# Patient Record
Sex: FEMALE | Race: WHITE | NOT HISPANIC OR LATINO | Employment: OTHER
[De-identification: names, ages, dates, MRNs, and addresses within clinical notes are randomized per-mention and may not be internally consistent; named-entity substitution may affect disease eponyms.]

---

## 2021-08-29 ENCOUNTER — HEALTH MAINTENANCE LETTER (OUTPATIENT)
Age: 71
End: 2021-08-29

## 2021-09-01 ENCOUNTER — TELEPHONE (OUTPATIENT)
Dept: ORTHOPEDICS | Facility: CLINIC | Age: 71
End: 2021-09-01

## 2021-10-11 DIAGNOSIS — M25.569 KNEE PAIN: Primary | ICD-10-CM

## 2021-10-11 PROBLEM — E11.9 DIABETES MELLITUS, TYPE II (H): Status: ACTIVE | Noted: 2018-07-26

## 2021-10-11 PROBLEM — N18.30 STAGE 3 CHRONIC KIDNEY DISEASE (H): Status: ACTIVE | Noted: 2020-04-30

## 2021-10-11 PROBLEM — I10 ESSENTIAL HYPERTENSION: Status: ACTIVE | Noted: 2020-01-07

## 2021-10-11 NOTE — PROGRESS NOTES
[facility]  Orthopaedic Surgery Consultation by Ernesto Avelar M.D.    Shannan Riojas MRN# [id number]   Age: 71 year old YOB: 1950     Requesting physician: No ref. provider found  No primary care provider on file.     Background history:  DX:  1. Stage 3 chronic kidney disease (H)  2. Essential hypertension  3. Diabetes mellitus, type II (H)  4. Hypercholesterolemia  5. Obesity    TREATMENTS:  1. None           History of Present Illness:   71 year old female presented to our clinic because of chronic right knee pain.  This pain has been present for multiple years.  It and started approximately 3 years after she fell twisted her knee.  She has attempted icing, heat, over-the-counter analgesics to mitigate the pain.  She cannot take anti-inflammatories because of her chronic kidney disease stage III.  Pain is present most notably on the medial side and does not radiate.  Patient is endorsing significant pain and limitations during ambulation, squatting and walking stairs.  She reports occasional night pain.  No significant initiation stiffness or soreness.  No lower back or hip pain.  No motor or sensory deficits.  The knee does not seem to swell.  No clear mechanical symptoms or instability.  Patient tries to remain active and does home exercise regimen.  She has not had intra-articular injections yet.    Her latest A1c was 6.9.  Patient does not smoke.    Social:   Occupation: Retired  Living situation: Lives with 2 dogs, has daughter close by  Hobbies / Sports: Sewing, reading, beading    Smoking: No  Alcohol: No  Illicit drug use: No         Physical Exam:     EXAMINATION pertinent findings:   PSYCH: Pleasant, healthy-appearing, alert, oriented x3, cooperative. Normal mood and affect.  VITAL SIGNS: There were no vitals taken for this visit.  Reviewed nursing intake notes.   There is no height or weight on file to calculate BMI.  RESP: non labored breathing   ABD: benign, soft,  non-tender, no acute peritoneal findings  SKIN: grossly normal   LYMPHATIC: grossly normal, no adenopathy, no extremity edema  NEURO: grossly normal , no motor deficits  VASCULAR: satisfactory perfusion of all extremities   MUSCULOSKELETAL:   Alignment: Slight varus alignment of right lower extremity.  Gait: Normal    Right hip exhibits a full range of motion.  No pain upon rotations.  Lasegue's test is negative.  No tenderness to palpation of the greater trochanter region.    R knee: -0-0  .  Full extension and deep flexion are recognizably painful in the medial side of the knee.  Straight leg raise +. No redness, warmth or skin changes present. Effusion Minimal. Ligamentously stable in both ML and AP direction.  Varus deformity is difficult to correct.  Normal PF tracking without crepitus.  Meniscal provocation tests are sensitive.  There is recognizable tenderness to palpation over the tibial joint line.     Right LE:   Thigh and leg compartments soft and compressible   +Quad/TA/GSC/FHL/EHL   SILT DP/SP/Deepa/Saph/Tib nerve distributions   Palpable dorsalis pedis pulse              Data:   All laboratory data reviewed  All imaging studies reviewed by me personally.    XR alignment films 10/27/2021:  My interpretation: Varus alignment of right lower extremity.    XR knee right 8/24/2021:  My interpretation: End-stage osteoarthritic changes of the right knee medial compartment with complete obliteration of joint space.  Presence of marginal osteophytes, sclerosis and subchondral cyst.  Mild to moderate osteoarthritic change of lateral and patellofemoral compartments.         Assessment and Plan:   Assessment:  71-year-old female presenting with chronic right knee pain due to underlying end-stage osteoarthritic changes most notably in the medial compartment.  Significantly reducing her quality of life and insufficiently responding to nonoperative treatment measures initiated.    Plan:  I had a long discussion  with the patient regarding ongoing management options.  Reviewed surgical and nonsurgical treatments.  The non-surgical options include activity modification, pain medication, PT, bracing and injection therapy.  We discussed that optimization of nonoperative treatment strategy is possible by means of injection therapy.  I do not see any benefit for  bracing as the varus deformity is not correctable.      As for surgery we discussed the option of total knee replacement surgery. We reviewed total knee replacement in detail including the procedure, the implants, the recovery process, and long-term outcomes.  We reviewed that the risks of the surgery include but are not limited to infection, wound problems, stiffness, persistent pain, swelling, clicking, loosening, revision surgery.  We also reviewed less common risks such as neurovascular injury fracture, and other implant-related issues.  We reviewed other medical complications such as a blood clot.  We discussed that the vast majority of cases have a highly successful outcome.  However there is a small subset of patients that do experience complications or problems following the knee replacement and these problems can be very debilitating and painful and sometimes do not improve. Based on a discussion of the risks and benefits, patient  would like to proceed with surgery as she is looking for long-term and sustained relief.  We will work on scheduling surgery at a time that works well for patient in the next few months.  Patient will contact us if there are any questions or concerns leading up to surgery. Before surgery the patient will attend a joint replacement class and will be seen by the PCP and dentist.     More information on joint replacements can be found on : [email or web address]    Thank you for your referral.    Ernesto Avelar MD, PhD     Adult Reconstruction  [facility]  [place] Department of Orthopaedic Surgery  Pager [phone]      DATA for DOCUMENTATION:         Past Medical History:     Patient Active Problem List   Diagnosis     Stage 3 chronic kidney disease (H)     Diabetes mellitus, type II (H)     Essential hypertension     Hypercholesterolemia     Obesity, unspecified     No past medical history on file.    Also see scanned health assessment forms.       Past Surgical History:   No past surgical history on file.         Social History:     Social History     Socioeconomic History     Marital status:      Spouse name: Not on file     Number of children: Not on file     Years of education: Not on file     Highest education level: Not on file   Occupational History     Not on file   Tobacco Use     Smoking status: Not on file   Substance and Sexual Activity     Alcohol use: Not on file     Drug use: Not on file     Sexual activity: Not on file   Other Topics Concern     Not on file   Social History Narrative     Not on file     Social Determinants of Health     Financial Resource Strain:      Difficulty of Paying Living Expenses:    Food Insecurity:      Worried About Running Out of Food in the Last Year:      Ran Out of Food in the Last Year:    Transportation Needs:      Lack of Transportation (Medical):      Lack of Transportation (Non-Medical):    Physical Activity:      Days of Exercise per Week:      Minutes of Exercise per Session:    Stress:      Feeling of Stress :    Social Connections:      Frequency of Communication with Friends and Family:      Frequency of Social Gatherings with Friends and Family:      Attends Voodoo Services:      Active Member of Clubs or Organizations:      Attends Club or Organization Meetings:      Marital Status:    Intimate Partner Violence:      Fear of Current or Ex-Partner:      Emotionally Abused:      Physically Abused:      Sexually Abused:             Family History:     No family history on file.          Medications:     No current outpatient medications on file.     No current facility-administered medications for this visit.              Review of Systems:   A comprehensive 10 point review of systems (constitutional, ENT, cardiac, peripheral vascular, lymphatic, respiratory, GI, , Musculoskeletal, skin, Neurological) was performed and found to be negative except as described in this note.     See intake form completed by patient

## 2021-10-24 ENCOUNTER — HEALTH MAINTENANCE LETTER (OUTPATIENT)
Age: 71
End: 2021-10-24

## 2021-10-27 ENCOUNTER — ANCILLARY PROCEDURE (OUTPATIENT)
Dept: GENERAL RADIOLOGY | Facility: CLINIC | Age: 71
End: 2021-10-27
Attending: STUDENT IN AN ORGANIZED HEALTH CARE EDUCATION/TRAINING PROGRAM
Payer: COMMERCIAL

## 2021-10-27 ENCOUNTER — OFFICE VISIT (OUTPATIENT)
Dept: ORTHOPEDICS | Facility: CLINIC | Age: 71
End: 2021-10-27
Payer: COMMERCIAL

## 2021-10-27 DIAGNOSIS — M17.11 PRIMARY OSTEOARTHRITIS OF RIGHT KNEE: Primary | ICD-10-CM

## 2021-10-27 DIAGNOSIS — M25.569 KNEE PAIN: ICD-10-CM

## 2021-10-27 PROCEDURE — 99204 OFFICE O/P NEW MOD 45 MIN: CPT | Performed by: STUDENT IN AN ORGANIZED HEALTH CARE EDUCATION/TRAINING PROGRAM

## 2021-10-27 PROCEDURE — 77073 BONE LENGTH STUDIES: CPT | Performed by: STUDENT IN AN ORGANIZED HEALTH CARE EDUCATION/TRAINING PROGRAM

## 2021-10-27 RX ORDER — CEFAZOLIN SODIUM 2 G/50ML
2 SOLUTION INTRAVENOUS SEE ADMIN INSTRUCTIONS
Status: CANCELLED | OUTPATIENT
Start: 2021-10-27

## 2021-10-27 RX ORDER — LEVOTHYROXINE SODIUM 125 UG/1
TABLET ORAL
COMMUNITY
Start: 2021-05-28

## 2021-10-27 RX ORDER — METOPROLOL SUCCINATE 100 MG/1
100 TABLET, EXTENDED RELEASE ORAL DAILY
COMMUNITY
Start: 2020-12-30

## 2021-10-27 RX ORDER — TRANEXAMIC ACID 650 MG/1
1950 TABLET ORAL ONCE
Status: CANCELLED | OUTPATIENT
Start: 2021-10-27 | End: 2021-10-27

## 2021-10-27 RX ORDER — TRAZODONE HYDROCHLORIDE 50 MG/1
TABLET, FILM COATED ORAL
COMMUNITY
Start: 2021-06-28

## 2021-10-27 RX ORDER — LISINOPRIL 10 MG/1
TABLET ORAL
COMMUNITY
Start: 2021-03-28 | End: 2022-02-02

## 2021-10-27 RX ORDER — AMLODIPINE BESYLATE 10 MG/1
10 TABLET ORAL DAILY
COMMUNITY
Start: 2021-01-26

## 2021-10-27 RX ORDER — CEFAZOLIN SODIUM 2 G/50ML
2 SOLUTION INTRAVENOUS
Status: CANCELLED | OUTPATIENT
Start: 2021-10-27

## 2021-10-27 RX ORDER — SERTRALINE HYDROCHLORIDE 100 MG/1
200 TABLET, FILM COATED ORAL
COMMUNITY
Start: 2020-12-23

## 2021-10-27 RX ORDER — ATORVASTATIN CALCIUM 40 MG/1
40 TABLET, FILM COATED ORAL DAILY
COMMUNITY
Start: 2019-11-08

## 2021-10-27 NOTE — LETTER
10/27/2021         RE: Shannan Riojas  [address]  [place] [address]        Dear Colleague,    Thank you for referring your patient, Shannan Riojas, to the [facility] [place]. Please see a copy of my visit note below.        [facility]  Orthopaedic Surgery Consultation by Ernesto Avelar M.D.    Shannan Riojas MRN# [id number]   Age: 71 year old YOB: 1950     Requesting physician: No ref. provider found  No primary care provider on file.     Background history:  DX:  1. Stage 3 chronic kidney disease (H)  2. Essential hypertension  3. Diabetes mellitus, type II (H)  4. Hypercholesterolemia  5. Obesity    TREATMENTS:  1. None           History of Present Illness:   71 year old female presented to our clinic because of chronic right knee pain.  This pain has been present for multiple years.  It and started approximately 3 years after she fell twisted her knee.  She has attempted icing, heat, over-the-counter analgesics to mitigate the pain.  She cannot take anti-inflammatories because of her chronic kidney disease stage III.  Pain is present most notably on the medial side and does not radiate.  Patient is endorsing significant pain and limitations during ambulation, squatting and walking stairs.  She reports occasional night pain.  No significant initiation stiffness or soreness.  No lower back or hip pain.  No motor or sensory deficits.  The knee does not seem to swell.  No clear mechanical symptoms or instability.  Patient tries to remain active and does home exercise regimen.  She has not had intra-articular injections yet.    Her latest A1c was 6.9.  Patient does not smoke.    Social:   Occupation: Retired  Living situation: Lives with 2 dogs, has daughter close by  Hobbies / Sports: Sewing, reading, beading    Smoking: No  Alcohol: No  Illicit drug use: No         Physical Exam:     EXAMINATION pertinent findings:   PSYCH: Pleasant,  healthy-appearing, alert, oriented x3, cooperative. Normal mood and affect.  VITAL SIGNS: There were no vitals taken for this visit.  Reviewed nursing intake notes.   There is no height or weight on file to calculate BMI.  RESP: non labored breathing   ABD: benign, soft, non-tender, no acute peritoneal findings  SKIN: grossly normal   LYMPHATIC: grossly normal, no adenopathy, no extremity edema  NEURO: grossly normal , no motor deficits  VASCULAR: satisfactory perfusion of all extremities   MUSCULOSKELETAL:   Alignment: Slight varus alignment of right lower extremity.  Gait: Normal    Right hip exhibits a full range of motion.  No pain upon rotations.  Lasegue's test is negative.  No tenderness to palpation of the greater trochanter region.    R knee: -0-0  .  Full extension and deep flexion are recognizably painful in the medial side of the knee.  Straight leg raise +. No redness, warmth or skin changes present. Effusion Minimal. Ligamentously stable in both ML and AP direction.  Varus deformity is difficult to correct.  Normal PF tracking without crepitus.  Meniscal provocation tests are sensitive.  There is recognizable tenderness to palpation over the tibial joint line.     Right LE:   Thigh and leg compartments soft and compressible   +Quad/TA/GSC/FHL/EHL   SILT DP/SP/Deepa/Saph/Tib nerve distributions   Palpable dorsalis pedis pulse              Data:   All laboratory data reviewed  All imaging studies reviewed by me personally.    XR alignment films 10/27/2021:  My interpretation: Varus alignment of right lower extremity.    XR knee right 8/24/2021:  My interpretation: End-stage osteoarthritic changes of the right knee medial compartment with complete obliteration of joint space.  Presence of marginal osteophytes, sclerosis and subchondral cyst.  Mild to moderate osteoarthritic change of lateral and patellofemoral compartments.         Assessment and Plan:   Assessment:  71-year-old female presenting  with chronic right knee pain due to underlying end-stage osteoarthritic changes most notably in the medial compartment.  Significantly reducing her quality of life and insufficiently responding to nonoperative treatment measures initiated.    Plan:  I had a long discussion with the patient regarding ongoing management options.  Reviewed surgical and nonsurgical treatments.  The non-surgical options include activity modification, pain medication, PT, bracing and injection therapy.  We discussed that optimization of nonoperative treatment strategy is possible by means of injection therapy.  I do not see any benefit for  bracing as the varus deformity is not correctable.      As for surgery we discussed the option of total knee replacement surgery. We reviewed total knee replacement in detail including the procedure, the implants, the recovery process, and long-term outcomes.  We reviewed that the risks of the surgery include but are not limited to infection, wound problems, stiffness, persistent pain, swelling, clicking, loosening, revision surgery.  We also reviewed less common risks such as neurovascular injury fracture, and other implant-related issues.  We reviewed other medical complications such as a blood clot.  We discussed that the vast majority of cases have a highly successful outcome.  However there is a small subset of patients that do experience complications or problems following the knee replacement and these problems can be very debilitating and painful and sometimes do not improve. Based on a discussion of the risks and benefits, patient  would like to proceed with surgery as she is looking for long-term and sustained relief.  We will work on scheduling surgery at a time that works well for patient in the next few months.  Patient will contact us if there are any questions or concerns leading up to surgery. Before surgery the patient will attend a joint replacement class and will be seen by  the PCP and dentist.     More information on joint replacements can be found on : [email or web address]    Thank you for your referral.    Ernesto Avelar MD, PhD     Adult Reconstruction  [facility] Department of Orthopaedic Surgery  Pager [phone]      DATA for DOCUMENTATION:         Past Medical History:     Patient Active Problem List   Diagnosis     Stage 3 chronic kidney disease (H)     Diabetes mellitus, type II (H)     Essential hypertension     Hypercholesterolemia     Obesity, unspecified     No past medical history on file.    Also see scanned health assessment forms.       Past Surgical History:   No past surgical history on file.         Social History:     Social History     Socioeconomic History     Marital status:      Spouse name: Not on file     Number of children: Not on file     Years of education: Not on file     Highest education level: Not on file   Occupational History     Not on file   Tobacco Use     Smoking status: Not on file   Substance and Sexual Activity     Alcohol use: Not on file     Drug use: Not on file     Sexual activity: Not on file   Other Topics Concern     Not on file   Social History Narrative     Not on file     Social Determinants of Health     Financial Resource Strain:      Difficulty of Paying Living Expenses:    Food Insecurity:      Worried About Running Out of Food in the Last Year:      Ran Out of Food in the Last Year:    Transportation Needs:      Lack of Transportation (Medical):      Lack of Transportation (Non-Medical):    Physical Activity:      Days of Exercise per Week:      Minutes of Exercise per Session:    Stress:      Feeling of Stress :    Social Connections:      Frequency of Communication with Friends and Family:      Frequency of Social Gatherings with Friends and Family:      Attends Mosque Services:      Active Member of Clubs or Organizations:       Attends Club or Organization Meetings:      Marital Status:    Intimate Partner Violence:      Fear of Current or Ex-Partner:      Emotionally Abused:      Physically Abused:      Sexually Abused:             Family History:     No family history on file.         Medications:     No current outpatient medications on file.     No current facility-administered medications for this visit.              Review of Systems:   A comprehensive 10 point review of systems (constitutional, ENT, cardiac, peripheral vascular, lymphatic, respiratory, GI, , Musculoskeletal, skin, Neurological) was performed and found to be negative except as described in this note.     See intake form completed by patient        Retired  Lives with 2 dogs   Sewing, reading, beading.     No  No  No      Again, thank you for allowing me to participate in the care of your patient.        Sincerely,        Ernesto Avelar MD

## 2021-10-27 NOTE — NURSING NOTE
Teaching Flowsheet   Relevant Diagnosis: Right knee osteoarthritis  Teaching Topic: Right total knee arthroplasty    Patient's health history is positive for HTN on medications, diabetic managing with diet, last A1C 6.9, hx of kidney stones x2. Does not tolerate oxycodone. Patient's support person will be her daughter Jerilyn Ramos. Patient will call to schedule; she is looking for early next year.     Person(s) involved in teaching:   Patient     Motivation Level:  Asks Questions: Yes  Eager to Learn: Yes  Cooperative: Yes  Receptive (willing/able to accept information): Yes  Any cultural factors/Cheondoism beliefs that may influence understanding or compliance? No     Patient demonstrates understanding of the following:  Reason for the appointment, diagnosis and treatment plan: Yes  Knowledge of proper use of medications and conditions for which they are ordered (with special attention to potential side effects or drug interactions): Yes  Which situations necessitate calling provider and whom to contact: Yes     Teaching Concerns Addressed: Discussed total joint online class. Patient understands he will need a preop exam within 30 days of date of surgery. He understands the expected length of stay and the expectation of outpatient physical therapy. Discussed dental prophylaxis as well as the need for COVID testing 4 days prior to surgery. Discussed the GetWell Loop.     Proper use and care of assistive devices (medical equip, care aids, etc.): Yes  Nutritional needs and diet plan: Yes  Pain management techniques: Yes  Wound Care: Yes  How and/when to access community resources: Yes     Instructional Materials Used/Given: Preoperative teaching packet, surgical soap x2, dental card, total joint class booklet.

## 2021-10-27 NOTE — LETTER
Date:November 29, 2021      Patient was self referred, no letter generated. Do not send.        [facility] Health Information

## 2021-10-28 ENCOUNTER — TELEPHONE (OUTPATIENT)
Dept: ORTHOPEDICS | Facility: CLINIC | Age: 71
End: 2021-10-28
Payer: COMMERCIAL

## 2021-10-28 DIAGNOSIS — Z11.59 ENCOUNTER FOR SCREENING FOR OTHER VIRAL DISEASES: ICD-10-CM

## 2021-10-28 DIAGNOSIS — M17.11 PRIMARY OSTEOARTHRITIS OF RIGHT KNEE: Primary | ICD-10-CM

## 2021-10-28 NOTE — TELEPHONE ENCOUNTER
Attempt to reach patient to schedule surgery for right tka.  Left message for patient to call the surgery scheduling line at [phone].     Kristen Dugan Surgery Scheduler

## 2021-11-05 NOTE — TELEPHONE ENCOUNTER
2nd attempt to reach patient to schedule surgery.     Left vmail. Awaiting callback.       Kristen Dugan, Surgery Scheduler       Opioid Counseling: I discussed with the patient the potential side effects of opioids including but not limited to addiction, altered mental status, and depression. I stressed avoiding alcohol, benzodiazepines, muscle relaxants and sleep aids unless specifically okayed by a physician. The patient verbalized understanding of the proper use and possible adverse effects of opioids. All of the patient's questions and concerns were addressed. They were instructed to flush the remaining pills down the toilet if they did not need them for pain.

## 2021-11-05 NOTE — TELEPHONE ENCOUNTER
Scheduled surgery    ATC: please place covid order and PT order.  Patient wants something closer to home. I did not schedule either  with patient at time of call.     Type of surgery: right TKA  Location of surgery: [facility]  Date and time of surgery: 2/3/2022 @ 0730  Surgeon: Rosio  Pre-Op Appt Date: patient will schedule with PCP  Post-Op Appt Date: 2/16/2022   Packet sent out: Yes  Pre-cert/Authorization completed:  No  Date: 11/5/21      Kristen Dugan Surgery Scheduler

## 2021-11-16 NOTE — TELEPHONE ENCOUNTER
Physical Therapy orders placed.   Covid-19 order placed.     Closing encounter.     Luiza Casas, ATC

## 2022-01-06 ENCOUNTER — TELEPHONE (OUTPATIENT)
Dept: ORTHOPEDICS | Facility: CLINIC | Age: 72
End: 2022-01-06
Payer: COMMERCIAL

## 2022-01-06 NOTE — TELEPHONE ENCOUNTER
RN called and left patient voicemail.   If patient calls back, please let patient know that her pre op at that date is okay and be sure she fax her pre op to the location where she surgery with Dr. Avelar.    Martina Street RN        [facility] Call Center    Phone Message    May a detailed message be left on voicemail: yes     Reason for Call:  Other: Pt is scheduled for surgery 2/3 and cant get pre op until 1/31. Wanting to confirm that will be ok     Action Taken: Message routed to:  [facility]o    Travel Screening: Not Applicable

## 2022-01-31 ENCOUNTER — TELEPHONE (OUTPATIENT)
Dept: ORTHOPEDICS | Facility: CLINIC | Age: 72
End: 2022-01-31
Payer: COMMERCIAL

## 2022-01-31 NOTE — TELEPHONE ENCOUNTER
[facility] Call Center    Phone Message    May a detailed message be left on voicemail: yes     Reason for Call: Other: Patient would like to receive more information about the (R) full knee replacement Pre-Surgery & about the surgery itself. Patient is feeling nervous so a call back is wanted. Phone number to call back on would be [phone].      Action Taken: Message routed to:  [facility]    Travel Screening: Not Applicable

## 2022-01-31 NOTE — TELEPHONE ENCOUNTER
RN called pt on behalf of Dr. Avelar team. Pt had questions regarding upcoming surgery on 2/3/22. Pt was wondering when she should arrive to surgery center, pt wanted to know how long her daughter could expect to wait/how long pt would be at hospital, and wanted to confirm fax number.   RN explained to pt that OR schedulers would call pt 1-2 days before surgery and let pt know when to arrive for surgery.   RN let pt know that an ideal surgery was about 7 hours from pre-op to leaving PACU. RN confirmed fax number given by pt. Pt verbalized understanding. All questions were answered.     Joey De La Torre RNCC

## 2022-02-01 ENCOUNTER — LAB (OUTPATIENT)
Dept: LAB | Facility: CLINIC | Age: 72
End: 2022-02-01
Payer: COMMERCIAL

## 2022-02-01 DIAGNOSIS — Z11.59 ENCOUNTER FOR SCREENING FOR OTHER VIRAL DISEASES: ICD-10-CM

## 2022-02-01 PROCEDURE — U0005 INFEC AGEN DETEC AMPLI PROBE: HCPCS

## 2022-02-01 PROCEDURE — U0003 INFECTIOUS AGENT DETECTION BY NUCLEIC ACID (DNA OR RNA); SEVERE ACUTE RESPIRATORY SYNDROME CORONAVIRUS 2 (SARS-COV-2) (CORONAVIRUS DISEASE [COVID-19]), AMPLIFIED PROBE TECHNIQUE, MAKING USE OF HIGH THROUGHPUT TECHNOLOGIES AS DESCRIBED BY CMS-2020-01-R: HCPCS

## 2022-02-02 ENCOUNTER — ANESTHESIA EVENT (OUTPATIENT)
Dept: SURGERY | Facility: CLINIC | Age: 72
End: 2022-02-02
Payer: COMMERCIAL

## 2022-02-02 LAB — SARS-COV-2 RNA RESP QL NAA+PROBE: NEGATIVE

## 2022-02-02 RX ORDER — LORAZEPAM 0.5 MG/1
TABLET ORAL
COMMUNITY
Start: 2021-11-02

## 2022-02-02 RX ORDER — LISINOPRIL 40 MG/1
TABLET ORAL
Status: ON HOLD | COMMUNITY
Start: 2022-01-16 | End: 2022-02-03

## 2022-02-02 NOTE — PROGRESS NOTES
[facility]  Orthopaedic Surgery Consultation by Ernesto Avelar M.D.    Shannan Riojas MRN# [id number]   Age: 71 year old YOB: 1950     Requesting physician: No ref. provider found  No primary care provider on file.     Background history:  DX:  1. Stage 3 chronic kidney disease (H)  2. Essential hypertension  3. Diabetes mellitus, type II (H)  4. Hypercholesterolemia  5. Obesity    TREATMENTS:  1. 2/3/2022, right total knee arthroplasty,            History of Present Illness:   71-year-old female presenting with chronic right knee pain due to underlying end-stage osteoarthritic changes most notably in the medial compartment.  Significantly reducing her quality of life and insufficiently responding to nonoperative treatment measures initiated. Patient underwent right total knee arthroplasty on 2/3/2022.    Today patient is approximately 2 weeks after the above-mentioned procedure. She states she is doing well. Her preoperative pain is gone. Her postoperative pain is well controlled on a regimen of Tylenol and occasional oxycodone. She has not started working with a physical therapist yet. The incision is healing well. No signs of infection. No fevers or chills. Patient is taking her DVT prophylaxis.    Social:   Occupation: Retired  Living situation: Lives with 2 dogs, has daughter close by  Hobbies / Sports: Sewing, reading, beading    Smoking: No  Alcohol: No  Illicit drug use: No         Physical Exam:     EXAMINATION pertinent findings:   PSYCH: Pleasant, healthy-appearing, alert, oriented x3, cooperative. Normal mood and affect.  VITAL SIGNS: There were no vitals taken for this visit.  Reviewed nursing intake notes.   There is no height or weight on file to calculate BMI.  RESP: non labored breathing   ABD: benign, soft, non-tender, no acute peritoneal findings  SKIN: grossly normal   LYMPHATIC: grossly normal, no adenopathy, +1 extremity edema  NEURO: grossly normal ,  no motor deficits  VASCULAR: satisfactory perfusion of all extremities   MUSCULOSKELETAL:   Alignment: Neutral alignment of right lower extremity.    R knee: Incision is clean, dry and intact. ROM 95-0-0  .   Straight leg raise +. No redness, warmth or skin changes present. Postoperative effusion is present. Ligamentously stable in both ML and AP direction.  Normal PF tracking without crepitus. No signs of DVT.    Right LE:   Thigh and leg compartments soft and compressible   +Quad/TA/GSC/FHL/EHL   SILT DP/SP/Deepa/Saph/Tib nerve distributions   Palpable dorsalis pedis pulse              Data:   All laboratory data reviewed  All imaging studies reviewed by me personally.    XR knee right 2/3/2022:  My interpretation: Status post placement of right total knee arthroplasty.  Adequate sizing, orientation and fixation of components.  No signs of immediate postoperative complications.             Assessment and Plan:   Assessment:  71-year-old female presenting with chronic right knee pain due to underlying end-stage osteoarthritic changes most notably in the medial compartment.  Significantly reducing her quality of life and insufficiently responding to nonoperative treatment measures initiated. Patient underwent right total knee arthroplasty on 2/3/2022. Recovering appropriately.    Plan:  I extensively discussed my findings with the patient.  We discussed the intraoperative findings and today's findings.  Patient will continue to work with physical therapy on range of motion, strengthening and gait training.  Patient will continue his DVT prophylaxis until 4 weeks postoperatively.  Suture tails were removed. Patient was advised to wear thigh-high compression stockings if the edema becomes bothersome. All questions were answered.  Patient understands and agrees to the treatment plan as set forth.  We will follow-up with patient at the 6-week postoperative date with renewed radiographic imaging studies.    Ernesto  MD Rosio, PhD     Adult Reconstruction  [facility] Department of Orthopaedic Surgery  Pager [phone]

## 2022-02-03 ENCOUNTER — APPOINTMENT (OUTPATIENT)
Dept: PHYSICAL THERAPY | Facility: CLINIC | Age: 72
End: 2022-02-03
Attending: STUDENT IN AN ORGANIZED HEALTH CARE EDUCATION/TRAINING PROGRAM
Payer: COMMERCIAL

## 2022-02-03 ENCOUNTER — ANESTHESIA (OUTPATIENT)
Dept: SURGERY | Facility: CLINIC | Age: 72
End: 2022-02-03
Payer: COMMERCIAL

## 2022-02-03 ENCOUNTER — APPOINTMENT (OUTPATIENT)
Dept: GENERAL RADIOLOGY | Facility: CLINIC | Age: 72
End: 2022-02-03
Attending: STUDENT IN AN ORGANIZED HEALTH CARE EDUCATION/TRAINING PROGRAM
Payer: COMMERCIAL

## 2022-02-03 ENCOUNTER — HOSPITAL ENCOUNTER (OUTPATIENT)
Facility: CLINIC | Age: 72
Discharge: HOME | End: 2022-02-06
Attending: STUDENT IN AN ORGANIZED HEALTH CARE EDUCATION/TRAINING PROGRAM | Admitting: STUDENT IN AN ORGANIZED HEALTH CARE EDUCATION/TRAINING PROGRAM
Payer: COMMERCIAL

## 2022-02-03 DIAGNOSIS — M17.11 OSTEOARTHROSIS, LOCALIZED, PRIMARY, KNEE, RIGHT: Primary | ICD-10-CM

## 2022-02-03 DIAGNOSIS — I10 ESSENTIAL HYPERTENSION: ICD-10-CM

## 2022-02-03 DIAGNOSIS — Z96.651 S/P TOTAL KNEE ARTHROPLASTY, RIGHT: ICD-10-CM

## 2022-02-03 LAB
GLUCOSE BLDC GLUCOMTR-MCNC: 126 MG/DL (ref 70–99)
GLUCOSE BLDC GLUCOMTR-MCNC: 133 MG/DL (ref 70–99)
GLUCOSE BLDC GLUCOMTR-MCNC: 144 MG/DL (ref 70–99)
GLUCOSE BLDC GLUCOMTR-MCNC: 95 MG/DL (ref 70–99)
POTASSIUM BLD-SCNC: 4.8 MMOL/L (ref 3.4–5.3)

## 2022-02-03 PROCEDURE — 999N000141 HC STATISTIC PRE-PROCEDURE NURSING ASSESSMENT: Performed by: STUDENT IN AN ORGANIZED HEALTH CARE EDUCATION/TRAINING PROGRAM

## 2022-02-03 PROCEDURE — 27447 TOTAL KNEE ARTHROPLASTY: CPT | Mod: RT | Performed by: STUDENT IN AN ORGANIZED HEALTH CARE EDUCATION/TRAINING PROGRAM

## 2022-02-03 PROCEDURE — 710N000010 HC RECOVERY PHASE 1, LEVEL 2, PER MIN: Performed by: STUDENT IN AN ORGANIZED HEALTH CARE EDUCATION/TRAINING PROGRAM

## 2022-02-03 PROCEDURE — 258N000001 HC RX 258: Performed by: STUDENT IN AN ORGANIZED HEALTH CARE EDUCATION/TRAINING PROGRAM

## 2022-02-03 PROCEDURE — 36415 COLL VENOUS BLD VENIPUNCTURE: CPT | Performed by: ANESTHESIOLOGY

## 2022-02-03 PROCEDURE — 250N000013 HC RX MED GY IP 250 OP 250 PS 637: Performed by: ANESTHESIOLOGY

## 2022-02-03 PROCEDURE — 999N000065 XR KNEE PORT RIGHT 1/2 VIEWS: Mod: RT

## 2022-02-03 PROCEDURE — 250N000011 HC RX IP 250 OP 636: Performed by: NURSE ANESTHETIST, CERTIFIED REGISTERED

## 2022-02-03 PROCEDURE — 258N000003 HC RX IP 258 OP 636: Performed by: ANESTHESIOLOGY

## 2022-02-03 PROCEDURE — C1776 JOINT DEVICE (IMPLANTABLE): HCPCS | Performed by: STUDENT IN AN ORGANIZED HEALTH CARE EDUCATION/TRAINING PROGRAM

## 2022-02-03 PROCEDURE — 272N000001 HC OR GENERAL SUPPLY STERILE: Performed by: STUDENT IN AN ORGANIZED HEALTH CARE EDUCATION/TRAINING PROGRAM

## 2022-02-03 PROCEDURE — 82962 GLUCOSE BLOOD TEST: CPT

## 2022-02-03 PROCEDURE — 250N000011 HC RX IP 250 OP 636: Performed by: ANESTHESIOLOGY

## 2022-02-03 PROCEDURE — 97161 PT EVAL LOW COMPLEX 20 MIN: CPT | Mod: GP

## 2022-02-03 PROCEDURE — 250N000011 HC RX IP 250 OP 636: Performed by: STUDENT IN AN ORGANIZED HEALTH CARE EDUCATION/TRAINING PROGRAM

## 2022-02-03 PROCEDURE — 84132 ASSAY OF SERUM POTASSIUM: CPT | Performed by: ANESTHESIOLOGY

## 2022-02-03 PROCEDURE — 97110 THERAPEUTIC EXERCISES: CPT | Mod: GP

## 2022-02-03 PROCEDURE — 258N000003 HC RX IP 258 OP 636: Performed by: STUDENT IN AN ORGANIZED HEALTH CARE EDUCATION/TRAINING PROGRAM

## 2022-02-03 PROCEDURE — C1713 ANCHOR/SCREW BN/BN,TIS/BN: HCPCS | Performed by: STUDENT IN AN ORGANIZED HEALTH CARE EDUCATION/TRAINING PROGRAM

## 2022-02-03 PROCEDURE — 73560 X-RAY EXAM OF KNEE 1 OR 2: CPT | Mod: 26 | Performed by: RADIOLOGY

## 2022-02-03 PROCEDURE — 99215 OFFICE O/P EST HI 40 MIN: CPT | Performed by: INTERNAL MEDICINE

## 2022-02-03 PROCEDURE — 370N000017 HC ANESTHESIA TECHNICAL FEE, PER MIN: Performed by: STUDENT IN AN ORGANIZED HEALTH CARE EDUCATION/TRAINING PROGRAM

## 2022-02-03 PROCEDURE — 97530 THERAPEUTIC ACTIVITIES: CPT | Mod: GP

## 2022-02-03 PROCEDURE — 250N000009 HC RX 250: Performed by: STUDENT IN AN ORGANIZED HEALTH CARE EDUCATION/TRAINING PROGRAM

## 2022-02-03 PROCEDURE — 250N000013 HC RX MED GY IP 250 OP 250 PS 637: Performed by: STUDENT IN AN ORGANIZED HEALTH CARE EDUCATION/TRAINING PROGRAM

## 2022-02-03 PROCEDURE — 360N000077 HC SURGERY LEVEL 4, PER MIN: Performed by: STUDENT IN AN ORGANIZED HEALTH CARE EDUCATION/TRAINING PROGRAM

## 2022-02-03 PROCEDURE — 278N000051 HC OR IMPLANT GENERAL: Performed by: STUDENT IN AN ORGANIZED HEALTH CARE EDUCATION/TRAINING PROGRAM

## 2022-02-03 PROCEDURE — 250N000009 HC RX 250: Performed by: NURSE ANESTHETIST, CERTIFIED REGISTERED

## 2022-02-03 PROCEDURE — 99207 PR CDG-CODE CATEGORY CHANGED: CPT | Performed by: INTERNAL MEDICINE

## 2022-02-03 DEVICE — IMPLANTABLE DEVICE
Type: IMPLANTABLE DEVICE | Site: KNEE | Status: FUNCTIONAL
Brand: PERSONA® VIVACIT-E®

## 2022-02-03 DEVICE — IMPLANTABLE DEVICE
Type: IMPLANTABLE DEVICE | Site: KNEE | Status: FUNCTIONAL
Brand: PERSONA™

## 2022-02-03 DEVICE — BONE CEMENT SIMPLEX FULL DOSE 6191-1-001: Type: IMPLANTABLE DEVICE | Site: KNEE | Status: FUNCTIONAL

## 2022-02-03 DEVICE — IMPLANTABLE DEVICE
Type: IMPLANTABLE DEVICE | Site: KNEE | Status: FUNCTIONAL
Brand: PERSONA®

## 2022-02-03 DEVICE — IMPLANTABLE DEVICE
Type: IMPLANTABLE DEVICE | Site: KNEE | Status: FUNCTIONAL
Brand: PERSONA® NATURAL TIBIA®

## 2022-02-03 RX ORDER — PROPOFOL 10 MG/ML
INJECTION, EMULSION INTRAVENOUS CONTINUOUS PRN
Status: DISCONTINUED | OUTPATIENT
Start: 2022-02-03 | End: 2022-02-03

## 2022-02-03 RX ORDER — EPHEDRINE SULFATE 50 MG/ML
INJECTION, SOLUTION INTRAVENOUS PRN
Status: DISCONTINUED | OUTPATIENT
Start: 2022-02-03 | End: 2022-02-03

## 2022-02-03 RX ORDER — LISINOPRIL 40 MG/1
40 TABLET ORAL DAILY
Refills: 0
Start: 2022-02-03 | End: 2022-02-06

## 2022-02-03 RX ORDER — AMOXICILLIN 250 MG
1 CAPSULE ORAL 2 TIMES DAILY
Status: DISCONTINUED | OUTPATIENT
Start: 2022-02-03 | End: 2022-02-06 | Stop reason: HOSPADM

## 2022-02-03 RX ORDER — BUPIVACAINE HYDROCHLORIDE 7.5 MG/ML
INJECTION, SOLUTION INTRASPINAL
Status: COMPLETED | OUTPATIENT
Start: 2022-02-03 | End: 2022-02-03

## 2022-02-03 RX ORDER — ONDANSETRON 4 MG/1
4 TABLET, ORALLY DISINTEGRATING ORAL EVERY 30 MIN PRN
Status: DISCONTINUED | OUTPATIENT
Start: 2022-02-03 | End: 2022-02-03 | Stop reason: HOSPADM

## 2022-02-03 RX ORDER — CARBOXYMETHYLCELLULOSE SODIUM 5 MG/ML
1 SOLUTION/ DROPS OPHTHALMIC
Status: DISCONTINUED | OUTPATIENT
Start: 2022-02-03 | End: 2022-02-06 | Stop reason: HOSPADM

## 2022-02-03 RX ORDER — ONDANSETRON 2 MG/ML
INJECTION INTRAMUSCULAR; INTRAVENOUS PRN
Status: DISCONTINUED | OUTPATIENT
Start: 2022-02-03 | End: 2022-02-03

## 2022-02-03 RX ORDER — CEFAZOLIN SODIUM 2 G/100ML
2 INJECTION, SOLUTION INTRAVENOUS SEE ADMIN INSTRUCTIONS
Status: DISCONTINUED | OUTPATIENT
Start: 2022-02-03 | End: 2022-02-03 | Stop reason: HOSPADM

## 2022-02-03 RX ORDER — ONDANSETRON 4 MG/1
4 TABLET, ORALLY DISINTEGRATING ORAL EVERY 6 HOURS PRN
Status: DISCONTINUED | OUTPATIENT
Start: 2022-02-03 | End: 2022-02-06 | Stop reason: HOSPADM

## 2022-02-03 RX ORDER — SERTRALINE HYDROCHLORIDE 100 MG/1
200 TABLET, FILM COATED ORAL DAILY
Status: DISCONTINUED | OUTPATIENT
Start: 2022-02-04 | End: 2022-02-06 | Stop reason: HOSPADM

## 2022-02-03 RX ORDER — HYDROMORPHONE HYDROCHLORIDE 1 MG/ML
0.2 INJECTION, SOLUTION INTRAMUSCULAR; INTRAVENOUS; SUBCUTANEOUS EVERY 5 MIN PRN
Status: DISCONTINUED | OUTPATIENT
Start: 2022-02-03 | End: 2022-02-03 | Stop reason: HOSPADM

## 2022-02-03 RX ORDER — NICOTINE POLACRILEX 4 MG
15-30 LOZENGE BUCCAL
Status: DISCONTINUED | OUTPATIENT
Start: 2022-02-03 | End: 2022-02-06 | Stop reason: HOSPADM

## 2022-02-03 RX ORDER — NALOXONE HYDROCHLORIDE 0.4 MG/ML
0.4 INJECTION, SOLUTION INTRAMUSCULAR; INTRAVENOUS; SUBCUTANEOUS
Status: DISCONTINUED | OUTPATIENT
Start: 2022-02-03 | End: 2022-02-03 | Stop reason: HOSPADM

## 2022-02-03 RX ORDER — ACETAMINOPHEN 325 MG/1
650 TABLET ORAL EVERY 4 HOURS PRN
Status: DISCONTINUED | OUTPATIENT
Start: 2022-02-06 | End: 2022-02-06 | Stop reason: HOSPADM

## 2022-02-03 RX ORDER — TRAZODONE HYDROCHLORIDE 50 MG/1
50 TABLET, FILM COATED ORAL
Status: DISCONTINUED | OUTPATIENT
Start: 2022-02-03 | End: 2022-02-06 | Stop reason: HOSPADM

## 2022-02-03 RX ORDER — BISACODYL 10 MG
10 SUPPOSITORY, RECTAL RECTAL DAILY PRN
Status: DISCONTINUED | OUTPATIENT
Start: 2022-02-03 | End: 2022-02-06 | Stop reason: HOSPADM

## 2022-02-03 RX ORDER — DEXTROSE MONOHYDRATE 25 G/50ML
25-50 INJECTION, SOLUTION INTRAVENOUS
Status: DISCONTINUED | OUTPATIENT
Start: 2022-02-03 | End: 2022-02-06 | Stop reason: HOSPADM

## 2022-02-03 RX ORDER — LIDOCAINE 40 MG/G
CREAM TOPICAL
Status: DISCONTINUED | OUTPATIENT
Start: 2022-02-03 | End: 2022-02-03 | Stop reason: HOSPADM

## 2022-02-03 RX ORDER — CEFAZOLIN SODIUM 2 G/100ML
2 INJECTION, SOLUTION INTRAVENOUS EVERY 8 HOURS
Status: COMPLETED | OUTPATIENT
Start: 2022-02-03 | End: 2022-02-04

## 2022-02-03 RX ORDER — CELECOXIB 200 MG/1
200 CAPSULE ORAL DAILY
Qty: 14 CAPSULE | Refills: 0 | Status: SHIPPED | OUTPATIENT
Start: 2022-02-03 | End: 2022-02-04

## 2022-02-03 RX ORDER — SODIUM CHLORIDE, SODIUM LACTATE, POTASSIUM CHLORIDE, CALCIUM CHLORIDE 600; 310; 30; 20 MG/100ML; MG/100ML; MG/100ML; MG/100ML
INJECTION, SOLUTION INTRAVENOUS CONTINUOUS
Status: DISCONTINUED | OUTPATIENT
Start: 2022-02-03 | End: 2022-02-04

## 2022-02-03 RX ORDER — ONDANSETRON 2 MG/ML
4 INJECTION INTRAMUSCULAR; INTRAVENOUS EVERY 6 HOURS PRN
Status: DISCONTINUED | OUTPATIENT
Start: 2022-02-03 | End: 2022-02-06 | Stop reason: HOSPADM

## 2022-02-03 RX ORDER — ACETAMINOPHEN 325 MG/1
650 TABLET ORAL EVERY 4 HOURS PRN
Qty: 100 TABLET | Refills: 0 | Status: SHIPPED | OUTPATIENT
Start: 2022-02-03

## 2022-02-03 RX ORDER — NALOXONE HYDROCHLORIDE 0.4 MG/ML
0.2 INJECTION, SOLUTION INTRAMUSCULAR; INTRAVENOUS; SUBCUTANEOUS
Status: DISCONTINUED | OUTPATIENT
Start: 2022-02-03 | End: 2022-02-03 | Stop reason: HOSPADM

## 2022-02-03 RX ORDER — POLYETHYLENE GLYCOL 3350 17 G/17G
1 POWDER, FOR SOLUTION ORAL DAILY
Qty: 7 PACKET | Refills: 0 | Status: SHIPPED | OUTPATIENT
Start: 2022-02-03

## 2022-02-03 RX ORDER — OXYCODONE HYDROCHLORIDE 5 MG/1
5 TABLET ORAL EVERY 4 HOURS PRN
Status: DISCONTINUED | OUTPATIENT
Start: 2022-02-03 | End: 2022-02-06 | Stop reason: HOSPADM

## 2022-02-03 RX ORDER — LISINOPRIL 40 MG/1
40 TABLET ORAL DAILY
Status: ON HOLD | COMMUNITY
End: 2022-02-04

## 2022-02-03 RX ORDER — LIDOCAINE 40 MG/G
CREAM TOPICAL
Status: DISCONTINUED | OUTPATIENT
Start: 2022-02-03 | End: 2022-02-06 | Stop reason: HOSPADM

## 2022-02-03 RX ORDER — CELECOXIB 100 MG/1
100 CAPSULE ORAL 2 TIMES DAILY
Status: DISCONTINUED | OUTPATIENT
Start: 2022-02-03 | End: 2022-02-04

## 2022-02-03 RX ORDER — GABAPENTIN 100 MG/1
100 CAPSULE ORAL AT BEDTIME
Status: DISCONTINUED | OUTPATIENT
Start: 2022-02-03 | End: 2022-02-06 | Stop reason: HOSPADM

## 2022-02-03 RX ORDER — ATORVASTATIN CALCIUM 40 MG/1
40 TABLET, FILM COATED ORAL DAILY
Status: DISCONTINUED | OUTPATIENT
Start: 2022-02-04 | End: 2022-02-06 | Stop reason: HOSPADM

## 2022-02-03 RX ORDER — SODIUM CHLORIDE, SODIUM LACTATE, POTASSIUM CHLORIDE, CALCIUM CHLORIDE 600; 310; 30; 20 MG/100ML; MG/100ML; MG/100ML; MG/100ML
INJECTION, SOLUTION INTRAVENOUS CONTINUOUS
Status: DISCONTINUED | OUTPATIENT
Start: 2022-02-03 | End: 2022-02-03 | Stop reason: HOSPADM

## 2022-02-03 RX ORDER — ACETAMINOPHEN 325 MG/1
975 TABLET ORAL EVERY 8 HOURS
Status: COMPLETED | OUTPATIENT
Start: 2022-02-03 | End: 2022-02-06

## 2022-02-03 RX ORDER — NALOXONE HYDROCHLORIDE 0.4 MG/ML
0.2 INJECTION, SOLUTION INTRAMUSCULAR; INTRAVENOUS; SUBCUTANEOUS
Status: DISCONTINUED | OUTPATIENT
Start: 2022-02-03 | End: 2022-02-06 | Stop reason: HOSPADM

## 2022-02-03 RX ORDER — OXYCODONE HYDROCHLORIDE 10 MG/1
10 TABLET ORAL EVERY 4 HOURS PRN
Status: DISCONTINUED | OUTPATIENT
Start: 2022-02-03 | End: 2022-02-06 | Stop reason: HOSPADM

## 2022-02-03 RX ORDER — METOPROLOL SUCCINATE 25 MG/1
100 TABLET, EXTENDED RELEASE ORAL DAILY
Status: DISCONTINUED | OUTPATIENT
Start: 2022-02-04 | End: 2022-02-06 | Stop reason: HOSPADM

## 2022-02-03 RX ORDER — HYDROMORPHONE HCL IN WATER/PF 6 MG/30 ML
0.4 PATIENT CONTROLLED ANALGESIA SYRINGE INTRAVENOUS
Status: DISCONTINUED | OUTPATIENT
Start: 2022-02-03 | End: 2022-02-06 | Stop reason: HOSPADM

## 2022-02-03 RX ORDER — POLYETHYLENE GLYCOL 3350 17 G/17G
17 POWDER, FOR SOLUTION ORAL DAILY
Status: DISCONTINUED | OUTPATIENT
Start: 2022-02-04 | End: 2022-02-06 | Stop reason: HOSPADM

## 2022-02-03 RX ORDER — FENTANYL CITRATE 50 UG/ML
25 INJECTION, SOLUTION INTRAMUSCULAR; INTRAVENOUS EVERY 5 MIN PRN
Status: DISCONTINUED | OUTPATIENT
Start: 2022-02-03 | End: 2022-02-03 | Stop reason: HOSPADM

## 2022-02-03 RX ORDER — LIDOCAINE HYDROCHLORIDE 20 MG/ML
INJECTION, SOLUTION INFILTRATION; PERINEURAL PRN
Status: DISCONTINUED | OUTPATIENT
Start: 2022-02-03 | End: 2022-02-03

## 2022-02-03 RX ORDER — OXYCODONE HYDROCHLORIDE 5 MG/1
5 TABLET ORAL EVERY 4 HOURS PRN
Status: DISCONTINUED | OUTPATIENT
Start: 2022-02-03 | End: 2022-02-03 | Stop reason: HOSPADM

## 2022-02-03 RX ORDER — CEFAZOLIN SODIUM 2 G/100ML
2 INJECTION, SOLUTION INTRAVENOUS
Status: COMPLETED | OUTPATIENT
Start: 2022-02-03 | End: 2022-02-03

## 2022-02-03 RX ORDER — ASPIRIN 81 MG/1
81 TABLET ORAL 2 TIMES DAILY
Status: DISCONTINUED | OUTPATIENT
Start: 2022-02-03 | End: 2022-02-06 | Stop reason: HOSPADM

## 2022-02-03 RX ORDER — ONDANSETRON 2 MG/ML
4 INJECTION INTRAMUSCULAR; INTRAVENOUS EVERY 30 MIN PRN
Status: DISCONTINUED | OUTPATIENT
Start: 2022-02-03 | End: 2022-02-03 | Stop reason: HOSPADM

## 2022-02-03 RX ORDER — TRANEXAMIC ACID 650 MG/1
1950 TABLET ORAL ONCE
Status: COMPLETED | OUTPATIENT
Start: 2022-02-03 | End: 2022-02-03

## 2022-02-03 RX ORDER — ASPIRIN 81 MG/1
81 TABLET ORAL 2 TIMES DAILY
Qty: 60 TABLET | Refills: 0 | Status: SHIPPED | OUTPATIENT
Start: 2022-02-03

## 2022-02-03 RX ORDER — PROCHLORPERAZINE MALEATE 5 MG
5 TABLET ORAL EVERY 6 HOURS PRN
Status: DISCONTINUED | OUTPATIENT
Start: 2022-02-03 | End: 2022-02-06 | Stop reason: HOSPADM

## 2022-02-03 RX ORDER — NALOXONE HYDROCHLORIDE 0.4 MG/ML
0.4 INJECTION, SOLUTION INTRAMUSCULAR; INTRAVENOUS; SUBCUTANEOUS
Status: DISCONTINUED | OUTPATIENT
Start: 2022-02-03 | End: 2022-02-06 | Stop reason: HOSPADM

## 2022-02-03 RX ORDER — AMOXICILLIN 250 MG
1-2 CAPSULE ORAL 2 TIMES DAILY
Qty: 30 TABLET | Refills: 0 | Status: SHIPPED | OUTPATIENT
Start: 2022-02-03

## 2022-02-03 RX ORDER — OXYCODONE HYDROCHLORIDE 5 MG/1
5-10 TABLET ORAL
Qty: 30 TABLET | Refills: 0 | Status: SHIPPED | OUTPATIENT
Start: 2022-02-03

## 2022-02-03 RX ORDER — HYDROMORPHONE HCL IN WATER/PF 6 MG/30 ML
0.2 PATIENT CONTROLLED ANALGESIA SYRINGE INTRAVENOUS
Status: DISCONTINUED | OUTPATIENT
Start: 2022-02-03 | End: 2022-02-06 | Stop reason: HOSPADM

## 2022-02-03 RX ADMIN — GABAPENTIN 100 MG: 100 CAPSULE ORAL at 21:33

## 2022-02-03 RX ADMIN — ONDANSETRON 4 MG: 2 INJECTION INTRAMUSCULAR; INTRAVENOUS at 07:41

## 2022-02-03 RX ADMIN — SODIUM CHLORIDE, POTASSIUM CHLORIDE, SODIUM LACTATE AND CALCIUM CHLORIDE: 600; 310; 30; 20 INJECTION, SOLUTION INTRAVENOUS at 07:23

## 2022-02-03 RX ADMIN — FENTANYL CITRATE 25 MCG: 50 INJECTION, SOLUTION INTRAMUSCULAR; INTRAVENOUS at 10:24

## 2022-02-03 RX ADMIN — LIDOCAINE HYDROCHLORIDE 40 MG: 20 INJECTION, SOLUTION INFILTRATION; PERINEURAL at 07:28

## 2022-02-03 RX ADMIN — HYDROMORPHONE HYDROCHLORIDE 0.2 MG: 1 INJECTION, SOLUTION INTRAMUSCULAR; INTRAVENOUS; SUBCUTANEOUS at 10:35

## 2022-02-03 RX ADMIN — ACETAMINOPHEN 975 MG: 325 TABLET, FILM COATED ORAL at 21:34

## 2022-02-03 RX ADMIN — PROPOFOL 50 MCG/KG/MIN: 10 INJECTION, EMULSION INTRAVENOUS at 07:36

## 2022-02-03 RX ADMIN — OXYCODONE HYDROCHLORIDE 10 MG: 10 TABLET ORAL at 21:33

## 2022-02-03 RX ADMIN — OXYCODONE HYDROCHLORIDE 5 MG: 5 TABLET ORAL at 16:53

## 2022-02-03 RX ADMIN — EPHEDRINE SULFATE 5 MG: 50 INJECTION, SOLUTION INTRAVENOUS at 08:02

## 2022-02-03 RX ADMIN — PROPOFOL 30 MCG/KG/MIN: 10 INJECTION, EMULSION INTRAVENOUS at 08:30

## 2022-02-03 RX ADMIN — OXYCODONE HYDROCHLORIDE 5 MG: 5 TABLET ORAL at 10:29

## 2022-02-03 RX ADMIN — CEFAZOLIN SODIUM 2 G: 2 INJECTION, SOLUTION INTRAVENOUS at 16:33

## 2022-02-03 RX ADMIN — FENTANYL CITRATE 25 MCG: 50 INJECTION, SOLUTION INTRAMUSCULAR; INTRAVENOUS at 10:17

## 2022-02-03 RX ADMIN — CEFAZOLIN 2 G: 10 INJECTION, POWDER, FOR SOLUTION INTRAVENOUS at 07:30

## 2022-02-03 RX ADMIN — MIDAZOLAM 1 MG: 1 INJECTION INTRAMUSCULAR; INTRAVENOUS at 07:20

## 2022-02-03 RX ADMIN — BUPIVACAINE HYDROCHLORIDE IN DEXTROSE 1.6 ML: 7.5 INJECTION, SOLUTION SUBARACHNOID at 07:33

## 2022-02-03 RX ADMIN — HYDROMORPHONE HYDROCHLORIDE 0.2 MG: 1 INJECTION, SOLUTION INTRAMUSCULAR; INTRAVENOUS; SUBCUTANEOUS at 10:54

## 2022-02-03 RX ADMIN — ASPIRIN 81 MG: 81 TABLET, COATED ORAL at 20:11

## 2022-02-03 RX ADMIN — CEFAZOLIN SODIUM 2 G: 2 INJECTION, SOLUTION INTRAVENOUS at 23:45

## 2022-02-03 RX ADMIN — CELECOXIB 100 MG: 100 CAPSULE ORAL at 20:11

## 2022-02-03 RX ADMIN — MIDAZOLAM 1 MG: 1 INJECTION INTRAMUSCULAR; INTRAVENOUS at 07:29

## 2022-02-03 RX ADMIN — ACETAMINOPHEN 975 MG: 325 TABLET, FILM COATED ORAL at 16:33

## 2022-02-03 RX ADMIN — TRANEXAMIC ACID 1950 MG: 650 TABLET ORAL at 06:48

## 2022-02-03 RX ADMIN — EPHEDRINE SULFATE 5 MG: 50 INJECTION, SOLUTION INTRAVENOUS at 07:47

## 2022-02-03 RX ADMIN — SENNOSIDES AND DOCUSATE SODIUM 1 TABLET: 50; 8.6 TABLET ORAL at 20:11

## 2022-02-03 RX ADMIN — EPHEDRINE SULFATE 10 MG: 50 INJECTION, SOLUTION INTRAVENOUS at 08:28

## 2022-02-03 RX ADMIN — HYDROMORPHONE HYDROCHLORIDE 0.4 MG: 0.2 INJECTION, SOLUTION INTRAMUSCULAR; INTRAVENOUS; SUBCUTANEOUS at 20:06

## 2022-02-03 RX ADMIN — FENTANYL CITRATE 25 MCG: 50 INJECTION, SOLUTION INTRAMUSCULAR; INTRAVENOUS at 10:30

## 2022-02-03 ASSESSMENT — MIFFLIN-ST. JEOR: SCORE: 1558.88

## 2022-02-03 NOTE — ANESTHESIA POSTPROCEDURE EVALUATION
Patient: Shannan Riojas    Procedure: Procedure(s):  ARTHROPLASTY, RIGHT KNEE, TOTAL       Diagnosis:Primary osteoarthritis of right knee [M17.11]  Diagnosis Additional Information: No value filed.    Anesthesia Type:  Spinal    Note:  Disposition: Admission   Postop Pain Control: Uneventful            Sign Out: Well controlled pain   PONV: No   Neuro/Psych:             Events: New weakness            Sign Out: Other neuro status   Airway/Respiratory: Uneventful            Sign Out: Acceptable/Baseline resp. status   CV/Hemodynamics: Uneventful            Sign Out: Acceptable CV status; No obvious hypovolemia; No obvious fluid overload   Other NRE:    DID A NON-ROUTINE EVENT OCCUR? YES    Event details/Postop Comments:  Following an uneventful intrathecal anesthesia and complete resolution of the sensory motor block in the lower extremities the patient has full strength of plantarflexion but very week dorsiflexion of the right foot. The surgeon was notified about the isolated right peroneal nerve dysfunction.            Last vitals:  Vitals Value Taken Time   /73 02/03/22 1145   Temp 36.7  C (98.1  F) 02/03/22 1115   Pulse 64 02/03/22 1156   Resp 15 02/03/22 1156   SpO2 97 % 02/03/22 1156   Vitals shown include unvalidated device data.    Electronically Signed By: Nancy Willard MD  February 3, 2022  11:57 AM

## 2022-02-03 NOTE — PROGRESS NOTES
Dorsiflexion absent with right foot, spinal worn off. Dr. Avelar aware, no new orders at this time and pt able to transfer to floor.

## 2022-02-03 NOTE — ANESTHESIA PREPROCEDURE EVALUATION
Anesthesia Pre-Procedure Evaluation    Patient: Shannan Riojas   MRN: [id number] : 1950        Preoperative Diagnosis: Primary osteoarthritis of right knee [M17.11]    Procedure : Procedure(s):  ARTHROPLASTY, RIGHT KNEE, TOTAL          Past Medical History:   Diagnosis Date     Diabetes (H)     Pt states is not checking BG/no meds (HgA1c 6.9)     Motion sickness       No past surgical history on file.   Allergies   Allergen Reactions     Oxycodone Nausea and Vomiting     Severe projectile vomiting     Ace Inhibitors Cough      Social History     Tobacco Use     Smoking status: Not on file     Smokeless tobacco: Not on file   Substance Use Topics     Alcohol use: Not on file      Wt Readings from Last 1 Encounters:   No data found for Wt        Anesthesia Evaluation   Pt has had prior anesthetic.     History of anesthetic complications  - motion sickness.      ROS/MED HX  ENT/Pulmonary:  - neg pulmonary ROS     Neurologic:  - neg neurologic ROS     Cardiovascular:     (+) Dyslipidemia hypertension-----Previous cardiac testing   Echo: Date: Results:    Stress Test: Date: Results:    ECG Reviewed: Date: Results:  NSR, RBBB, left atrial enlargement   Cath: Date: Results:      METS/Exercise Tolerance:     Hematologic:  - neg hematologic  ROS     Musculoskeletal:   (+) arthritis,     GI/Hepatic:  - neg GI/hepatic ROS     Renal/Genitourinary:     (+) renal disease, type: CRI, Pt does not require dialysis,     Endo:     (+) type II DM, Last HgA1c: 6.8, thyroid problem, hypothyroidism, Obesity,     Psychiatric/Substance Use:     (+) psychiatric history depression     Infectious Disease:  - neg infectious disease ROS     Malignancy:  - neg malignancy ROS     Other:  - neg other ROS          Physical Exam    Airway  airway exam normal      Mallampati: I   TM distance: > 3 FB   Neck ROM: full   Mouth opening: > 3 cm    Respiratory Devices and Support         Dental  no notable dental history         Cardiovascular    cardiovascular exam normal       Rhythm and rate: regular and normal     Pulmonary   pulmonary exam normal                OUTSIDE LABS:  CBC: No results found for: WBC, HGB, HCT, PLT  BMP: No results found for: NA, POTASSIUM, CHLORIDE, CO2, BUN, CR, GLC  COAGS: No results found for: PTT, INR, FIBR  POC: No results found for: BGM, HCG, HCGS  HEPATIC: No results found for: ALBUMIN, PROTTOTAL, ALT, AST, GGT, ALKPHOS, BILITOTAL, BILIDIRECT, AKASH  OTHER: No results found for: PH, LACT, A1C, ELIU, PHOS, MAG, LIPASE, AMYLASE, TSH, T4, T3, CRP, SED    Anesthesia Plan    ASA Status:  3   NPO Status:  NPO Appropriate    Anesthesia Type: Spinal.              Consents    Anesthesia Plan(s) and associated risks, benefits, and realistic alternatives discussed. Questions answered and patient/representative(s) expressed understanding.    - Discussed:     - Discussed with:  Patient      - Extended Intubation/Ventilatory Support Discussed: No.      - Patient is DNR/DNI Status: No    Use of blood products discussed: Yes.     - Discussed with: Patient.     - Consented: consented to blood products            Reason for refusal: other.     Postoperative Care    Pain management: IV analgesics, Oral pain medications.   PONV prophylaxis: Ondansetron (or other 5HT-3), Background Propofol Infusion     Comments:    Other Comments: Intrathecal anesthesia, sedation, GA as back up  Standard ASA monitors  All pertinent notes and results reviewed.  Risks, benefits discussed including HA, back pain, failed block, airway injury, hypoxemia, laryngo/bronchospasm, PONV as common complications with GA, questions/concerns answered.            Nancy Willard MD

## 2022-02-03 NOTE — LETTER
Health Information Management Services               Recipient:    Tria        Sender:    Sara Tatum RN, BSN  Care Coordinator, [facility]  Phone [phone]  Pager [phone]        Date: February 4, 2022  Patient Name:  Shannan Riojas  Routing Message:      Therapy orders      The documents accompanying this notice contain confidential information belonging to the sender.  This information is intended only for the use of the individual or entity named above.  The authorized recipient of this information is prohibited from disclosing this information to any other party and is required to destroy the information after its stated need has been fulfilled, unless otherwise required by state law.      If you are not the intended recipient, you are hereby notified that any disclosure, copy, distribution or action taken in reliance on the contents of these documents is strictly prohibited.  If you have received this document in error, please return it by fax to [phone] with a note on the cover sheet explaining why you are returning it (e.g. not your patient, not your provider, etc.).  If you need further assistance, please call [facility] Centralized Transcription at [phone].  Documents may also be returned by mail to [employer], , [facility] [address], [place], [place] [address].

## 2022-02-03 NOTE — PROGRESS NOTES
"   02/03/22 1554   Quick Adds   Type of Visit Initial PT Evaluation   Living Environment   People in home alone;other (see comments)  (lives alone typically; staying with her daughter post-op)   Current Living Arrangements house   Home Accessibility stairs to enter home;stairs within home   Number of Stairs, Main Entrance 2   Stair Railings, Main Entrance none   Number of Stairs, Within Home, Primary 4   Stair Railings, Within Home, Primary railing on right side (ascending)   Living Environment Comments Pt typically lives alone but will be staying with her daughter initally (see above info on stairs). Daughter works from home. Pt's daughter's house has walk-in shower & has riser on toilet. To enter pt's home, there is 5+3 steps with railings, then all needs met 1 level except 4 stairs down to let dogs out in yard. Pt unsure how long she will stay with her daughter, \"it depends on how I'm feeling.\" pt's bathroom has tub shower with grab bars, riser on toilet.    Self-Care   Activity/Exercise/Self-Care Comment Pt is independent with all mobility at baseline, has a cane that she sometimes uses but not often. Pt does have walker available for home use (borrowed from a friend).    Disability/Function   Vision Management glasses   Fall history within last six months yes   Number of times patient has fallen within last six months 3  (per pt)   General Information   Onset of Illness/Injury or Date of Surgery 02/03/22   Referring Physician Ernesto Avelar MD   Patient/Family Therapy Goals Statement (PT) to go home (to daughter's house) tomorrow   Pertinent History of Current Problem (include personal factors and/or comorbidities that impact the POC) Pt is POD #0 R TKA. PMH significant for hypertension , essential tremor, hypothyroidism    Existing Precautions/Restrictions fall   Weight-Bearing Status - LUE full weight-bearing   Weight-Bearing Status - RUE full weight-bearing   Weight-Bearing Status - LLE full " "weight-bearing   Weight-Bearing Status - RLE weight-bearing as tolerated   General Observations Patient is pleasant & agreeable to PT.   Cognition   Orientation Status (Cognition) oriented x 4   Follows Commands (Cognition) follows multi-step commands;over 90% accuracy   Pain Assessment   Patient Currently in Pain Yes, see Vital Sign flowsheet  (2/10 foot/ \"mostly 'cause my foot is asleep\")   Integumentary/Edema   Integumentary/Edema Comments R LE wrapped with ACE wrap, dressing not visualized   Range of Motion (ROM)   ROM Comment R knee ROM reduced post op, AAROM R knee ~80-90 degrees flexion, lacking full extension (neither formally measured). R AROM DF reduced but is able to actively DF some. All other ROM appears WFL   Strength   Strength Comments Pt demos post-op weakness R LE, but is able to perform R SLR. R DF strength reduced with reduced AROM compared to L but functional for gait, pt able to DF to clear forefoot from floor with gait   Bed Mobility   Comment (Bed Mobility) Supine>sit with SBA from EOB with use of rail, pt uses momentum to propel into sitting  (pt has bedrail with flat bed at home)   Transfers   Transfer Safety Comments Sit>stand from EOB with CGA and walker, with increased pain with transition; impaired ecc control with stand>sit   Gait/Stairs (Locomotion)   Distance in Feet (Required for LE Total Joints) 70   Comment (Gait/Stairs) Patient ambulated in room and gutiérrez with FWW and CGA with moderate reliance on UE support on walker, antalgic R with short steps L shorter than R, slow pace, some unsteadiness at times   Balance   Balance Comments Needs B UE support on walker and CGA for safe dynamic mobility   Sensory Examination   Sensory Perception Comments reports numbness/tingling R foot   Clinical Impression   Criteria for Skilled Therapeutic Intervention yes, treatment indicated   PT Diagnosis (PT) difficulty ambulating   Influenced by the following impairments pain, decreased activity " tolerance, impaired balance, post op weakness R LE   Functional limitations due to impairments difficulty with bed mobility, transfers, ambulation, stairs   Clinical Presentation Stable/Uncomplicated   Clinical Presentation Rationale clinical judgement   Clinical Decision Making (Complexity) low complexity   Therapy Frequency (PT) Daily   Predicted Duration of Therapy Intervention (days/wks) 2 days   Planned Therapy Interventions (PT) balance training;bed mobility training;gait training;cryotherapy;home exercise program;patient/family education;neuromuscular re-education;ROM (range of motion);stair training;strengthening;stretching;transfer training;progressive activity/exercise;home program guidelines   Anticipated Equipment Needs at Discharge (PT)   (has walker to use)   Risk & Benefits of therapy have been explained evaluation/treatment results reviewed;care plan/treatment goals reviewed;participants voiced agreement with care plan;participants included;patient   PT Discharge Planning    PT Discharge Recommendation (DC Rec) home with outpatient physical therapy  (to daughter's home)   PT Rationale for DC Rec Patient mobilizing fairly well POD#1, currently needind SBA-CGA for bed mobilty, transfers and gait with walker. Anticipate pt will progress to be appropriate for dc to daughter's house with assist for stairs and use of walker. Recommend OP PT follow up to progress ROM, strength and optimize functional mobility and independence post-op R TKA.   PT Brief overview of current status  CGA with walker   Total Evaluation Time   Total Evaluation Time (Minutes) 14

## 2022-02-03 NOTE — ANESTHESIA PROCEDURE NOTES
Intrathecal injection Procedure Note    Pre-Procedure   Staff -        Anesthesiologist:  Nancy Willard MD       Performed By: anesthesiologist       Location: OR       Procedure Start/Stop Times: 2/3/2022 7:28 AM and 2/3/2022 7:33 AM       Pre-Anesthestic Checklist: patient identified, IV checked, risks and benefits discussed, informed consent, monitors and equipment checked, pre-op evaluation, at physician/surgeon's request and post-op pain management  Timeout:       Correct Patient: Yes        Correct Procedure: Yes        Correct Site: Yes        Correct Position: Yes   Procedure Documentation  Procedure: intrathecal injection       Patient Position: sitting       Skin prep: Betadine       Insertion Site: L3-4. (midline approach).       Needle Gauge: 25.        Needle Length (Inches): 3.5        Spinal Needle Type: Evaristo tip    Assessment/Narrative         Paresthesias: No.       Sensory Level: T10       CSF fluid: clear.    Medication(s) Administered   0.75% Hyperbaric Bupivacaine (Intrathecal), 1.6 mL  Medication Administration Time: 2/3/2022 7:33 AM

## 2022-02-03 NOTE — ANESTHESIA CARE TRANSFER NOTE
Patient: Shannan Riojas    Procedure: Procedure(s):  ARTHROPLASTY, RIGHT KNEE, TOTAL       Diagnosis: Primary osteoarthritis of right knee [M17.11]  Diagnosis Additional Information: No value filed.    Anesthesia Type:   Spinal     Note:    Oropharynx: oropharynx clear of all foreign objects  Level of Consciousness: awake  Oxygen Supplementation: face mask  Level of Supplemental Oxygen (L/min / FiO2): 6  Independent Airway: airway patency satisfactory and stable  Dentition: dentition unchanged  Vital Signs Stable: post-procedure vital signs reviewed and stable  Report to RN Given: handoff report given  Patient transferred to: PACU  Comments: Anesthesia Care Transfer Note    Patient: Shannan Riojas    Transferred to: PACU with supplemental O2    Patient vital signs: stable    Airway: none    Monitors on, VSS, breathing spontaneously, report to RN      Bess Simpson CRNA   2/3/2022 9:30 AM  Handoff Report: Identifed the Patient, Identified the Reponsible Provider, Reviewed the pertinent medical history, Discussed the surgical course, Reviewed Intra-OP anesthesia mangement and issues during anesthesia, Set expectations for post-procedure period and Allowed opportunity for questions and acknowledgement of understanding      Vitals:  Vitals Value Taken Time   BP     Temp     Pulse     Resp     SpO2         Electronically Signed By: EBONIE Laird CRNA  February 3, 2022  9:30 AM

## 2022-02-03 NOTE — CONSULTS
"[facility]  Consult Note - Hospitalist Service, GOLD TEAM 16  Date of Admission:  2/3/2022  Consult Requested by: Ernesto Avelar MD   Reason for Consult: assist with perioperative medical management     Assessment & Plan   Shannan Riojas is a 72 year old female admitted on 2/3/2022. She has history hypertension , essential tremor, hypothyroidism .     Osteoarthritis  Status post  Right knee replacement, per orthopedic surgery     right foot drop,postop,  she is now able dorsiflex some, monitor, continue physical therapy  , might need foot brace , will defer back to orthopedic surgery      Hypertension   - she took her metoprolol  Abdominal amlodipine this AM, held her lisinopril , continue bb and amlodipine with holding parameters, restart lisinopril ove blood pressure  consistently over 120, she has blood pressure  ,machine at home and will monitor at home      CKD  -repeat BMP in AM      hypothyroidism  -continue medications    T2DM  -diet controlled , accu-checks insulin sliding scale for now     Depression  -continue ,medications          Stephanie Bazan MD  [facility]  Securely message with the Vocera Web Console (learn more here)  Text page via Cloud[employer] Paging/Directory   Please see signed in provider for up to date coverage information    Hospitalist Service, GOLD TEAM 16    Clinically Significant Risk Factors Present on Admission                 # Obesity: Estimated body mass index is 38.45 kg/m  as calculated from the following:    Height as of this encounter: 1.651 m (5' 5\").    Weight as of this encounter: 104.8 kg (231 lb 0.7 oz).      ______________________________________________________________________    Chief Complaint    Knee pain   History is obtained from the patient    History of Present Illness   Shannan Riojas is a 72 year old female who came in for elective knee surgery. I saw patient  Post " operatively. She is doing ok, denies any chest pain  No shortness of breath , still on 2 liters NC,. No nausea vomiting. She has issues with dorsiflexing her right foot ,    Review of Systems   The 10 point Review of Systems is negative other than noted in the HPI or here.     Past Medical History    I have reviewed this patient's medical history and updated it with pertinent information if needed.   Past Medical History:   Diagnosis Date     CKD (chronic kidney disease)      Depression      Diabetes (H)     Pt states is not checking BG/no meds (HgA1c 6.9)     Essential tremor      HTN (hypertension)      Hypothyroidism      Motion sickness        Past Surgical History        BREAST BIOPSY   Lt breast needle bx neg     NEEDLE, BIOPSY 2007   minor lumps (benign)      Social History   I have reviewed this patient's social history and updated it with pertinent information if needed.  Social History     Tobacco Use     Smoking status: Former Smoker     Start date: 1/1/1988     Smokeless tobacco: Never Used   Substance Use Topics     Alcohol use: None     Drug use: None       Family History   I have reviewed this patient's family history and updated it with pertinent information if needed.  Family History   Problem Relation Age of Onset     Breast Cancer Mother      Heart Disease Father          Medications   Medications Prior to Admission   Medication Sig Dispense Refill Last Dose     amLODIPine (NORVASC) 10 MG tablet Take 10 mg by mouth   2/3/2022 at 0430     atorvastatin (LIPITOR) 40 MG tablet Take 40 mg by mouth   2/3/2022 at 0430     levothyroxine (SYNTHROID/LEVOTHROID) 125 MCG tablet TAKE 1 TABLET BY MOUTH EVERY DAY   2/3/2022 at 0430     lisinopril (ZESTRIL) 40 MG tablet    Past Week at Unknown time     LORazepam (ATIVAN) 0.5 MG tablet Take 1-2 tabs 30-60 min prior to dental appointment.   Unknown at Unknown time     metoprolol succinate ER (TOPROL-XL) 100 MG 24 hr tablet Take 100 mg by mouth   2/3/2022 at 0430      sertraline (ZOLOFT) 100 MG tablet Take 200 mg by mouth   2/3/2022 at 0430     traZODone (DESYREL) 50 MG tablet TAKE 1 TO 2 TABLETS BY MOUTH AT BEDTIME AS NEEDED FOR SLEEP   Past Week at Unknown time       Allergies   Allergies   Allergen Reactions     Oxycodone Nausea and Vomiting     Severe projectile vomiting     Ace Inhibitors Cough       Physical Exam   Vital Signs: Temp: 98.1  F (36.7  C) Temp src: Oral BP: 135/72 Pulse: 63   Resp: 15 SpO2: 97 % O2 Device: Nasal cannula Oxygen Delivery: 2 LPM  Weight: 231 lbs .67 oz    General appearance: awake alert in  no apparent distress     HEENT: EOMI and PEARLA, sclera nonicteric, moist mucus membranes, head atraumatic  NECK: supple  RESPIRATORY: lungs are clear to ausculation with normal vesicular sounds bilateral,     CARDIOVASCULAR: normal S1S2 regular rate and rhythm, no rubs gallops or murmurs appreciated, good pedal pulses   GASTROINTESTINAL: abdomen is  obese, soft,  non-distended, non-tender with normal bowel sounds. No masses felt, no hepatosplenomegaly noted    MUSCULOSKELETAL: status post  Right knee surgery   SKIN: warm and dry, no rashes, no mottling noted   NEUROLOGIC: awake alert and oriented, voice staccato ( chronic), minimal dorsiflexion of right foot, able to push down strongly with right toes   EXTREMITIES: no clubbing cyanosis or edema noted  moves all extremities        Data   Most Recent 3 CBC's:No lab results found.

## 2022-02-03 NOTE — PROGRESS NOTES
PACU to Inpatient Nursing Handoff    Patient Shannan Riojas is a 72 year old female who speaks English.   Procedure Procedure(s):  ARTHROPLASTY, RIGHT KNEE, TOTAL   Surgeon(s) Primary: Ernesto Avelar MD  Assisting: Jacob Cee PARAMÓN  Fellow - Assisting: Mando River MD     Allergies   Allergen Reactions     Oxycodone Nausea and Vomiting     Severe projectile vomiting     Ace Inhibitors Cough       Isolation  No active isolations     Past Medical History   has a past medical history of Diabetes (H) and Motion sickness.    Anesthesia Spinal   Dermatome Level     Preop Meds txa - time given: 0648   Nerve block Not applicable   Intraop Meds ondansetron (Zofran): last given at 0741  versed 2mg, propofol, ephedrine   Local Meds Yes - Local Cocktail (morphine, ropivacaine, epinephrine, Toradol)   Antibiotics cefazolin (Ancef) - last given at 0730     Pain Patient Currently in Pain: denies   PACU meds  fentanyl (Sublimaze): 75 mcg (total dose) last given at 1031   hydromorphone (Dilaudid): 0.2 mg (total dose) last given at 1035   oxycodone (Roxicodone): 5 mg (total dose) last given at 1030    PCA / epidural No   Capnography Respiratory Monitoring (EtCO2): 47 mmHg   Telemetry ECG Rhythm: Bundle branch block   Inpatient Telemetry Monitor Ordered? No        Labs Glucose Lab Results   Component Value Date     02/03/2022       Hgb No results found for: HGB    INR No results found for: INR   PACU Imaging Completed     Wound/Incision Incision/Surgical Site 02/03/22 Right Knee (Active)   Incision Assessment UTV 02/03/22 0937   Incision Drainage Amount None 02/03/22 0937   Dressing Intervention Clean, dry, intact 02/03/22 0937   Number of days: 0      CMS        Equipment ice pack   Other LDA       IV Access Peripheral IV 02/03/22 Left Hand (Active)   Site Assessment WDL 02/03/22 0714   Line Status Saline locked 02/03/22 0714   Dressing Intervention New dressing  02/03/22 0714   Phlebitis Scale 0-->no symptoms  02/03/22 0714   Infiltration Scale 0 02/03/22 0714   Number of days: 0      Blood Products Not applicable EBL 50 mL   Intake/Output Date 02/03/22 0700 - 02/04/22 0659   Shift 9224-3630 3525-9811 9451-7719 24 Hour Total   INTAKE   I.V. 850   850   Shift Total(mL/kg) 850(8.11)   850(8.11)   OUTPUT   Blood 50   50   Shift Total(mL/kg) 50(0.48)   50(0.48)   Weight (kg) 104.8 104.8 104.8 104.8      Drains / [place]     Time of void PreOp Void Prior to Procedure: 0500 (02/03/22 0620)    PostOp      Diapered? No   Bladder Scan     PO    crackers and water     Vitals    B/P: (!) 144/73  T: 98.4  F (36.9  C)    Temp src: Oral  P:  Pulse: 69 (02/03/22 1015)          R: 15  O2:  SpO2: 97 %    O2 Device: Nasal cannula (02/03/22 1007)    Oxygen Delivery: 2 LPM (02/03/22 1007)         Family/support present Dorene, daughter,    Patient belongings  2 bags and walker   Patient transported on cart   DC meds/scripts (obs/outpt) Not applicable   Inpatient Pain Meds Released? Yes       Special needs/considerations Nausea/vomiting with oxycodone on empty stomach in past, pt okay with trying with food   Tasks needing completion None       Cristina Ford, RN  [facility] [id number]

## 2022-02-03 NOTE — OP NOTE
[facility]    Operative Note    Pre-operative diagnosis: 71-year-old female presenting with chronic right knee pain due to underlying end-stage osteoarthritic changes most notably in the medial compartment.  Significantly reducing her quality of life and insufficiently responding to nonoperative treatment measures initiated.  Post-operative diagnosis Same as pre-operative diagnosis    Procedure: Procedure(s):  ARTHROPLASTY, RIGHT KNEE, TOTAL  Surgeon: Surgeon(s) and Role:     * Ernesto Avelar MD - Primary     * Jacob Cee PA-C - Assisting     * Mando River MD - Fellow - Assisting  Anesthesia: Spinal   Estimated Blood Loss: Less than 50 ml    Drains: None  Specimens: * No specimens in log *  Findings:   None.  Complications: None.  Implants:   Implant Name Type Inv. Item Serial No.  Lot No. LRB No. Used Action   BONE CEMENT SIMPLEX FULL DOSE 6191-1-001 - [id number] Cement, Bone BONE CEMENT SIMPLEX FULL DOSE 6191-1-001  [employer] [id number] Right 2 Implanted   KNEE FEMUR CR CEMENT CCR STD SZ 6 R - [id number] Total Joint Component/Insert KNEE FEMUR CR CEMENT CCR STD SZ 6 R  [employer] [id number] Right 1 Implanted   IMP TIBIAL ZIM PSN NP STM 5DEG SZ ER -988-02 - [id number] Total Joint Component/Insert IMP TIBIAL ZIM PSN NP STM 5DEG SZ ER -080-02  [employer] [id number] Right 1 Implanted   IMP PATELLA ZIM KNEE ALL JERZY 32MM -528-32 - [id number] Total Joint Component/Insert IMP PATELLA ZIM KNEE ALL JERZY 32MM -624-32  [employer] [id number] Right 1 Implanted   PERSONA Vivacit-E Highly Crosslinked Polyehtylene Articular Surface Right 11mm    [employer] [id number] Right 1 Implanted       Components used:  1. Harper Persona Size 6 standard Femoral Component  2. Harper Persona Size E Tibial Component  3. Harper Size 32mm Patellar Button    4. Harper Persona MC Polyethylene Liner, Size 11 mm     Description of  procedure:      The presence of  LAURA Hamm was necessary throughout the entirety of the procedure to help with positioning the patient, retract soft tissues and help close the incision.     Patient was seen in the preoperative area where procedure, risks and complications, expectations and rehabilitation were discussed once more.  All questions were answered.  Patient understands and agrees to the treatment plan as set forth.  Informed consent was obtained and the right lower extremity was marked.  Patient was then taken to the operating room where general anesthesia was induced.     Patient was positioned supine with a bump under the ipsilateral buttock. Bony prominences were well padded and the patient was secured to the table in the standard fashion.  An SCD was placed on the nonoperative leg.  Preoperative range of motion showed a flexion/extension of 120/5/0.      A tourniquet was placed on the operative thigh and the patient was prepped and draped in the normal sterile fashion.        After the completion of a timeout procedure a standard midline incision was made. Dissection was carried down to the knee retinaculum and the quadriceps tendon. A standard medial parapatellar arthrotomy was performed. Subperiosteal dissection was carried out along the medial proximal tibia. The fat pad was removed.  Care was taken to protect the patellar tendon and extensor mechanism during fat pad removal. The tissue along the anterior aspect of the distal femur was also removed.  The patella was subluxed and the knee was flexed.       The ACLwas released.  A drill was advanced down the femoral canal in a retrograde direction. The sword was set at 5 degrees of valgus in accordance with the preoperative plan and was advanced into the canal.  The distal femoral cutting block was then placed at +0 mm resection t and pinned into place. The heriberto was removed and the distal femur cuts were made medially and laterally.  The  cutting block was removed and the heriberto with the alignment piece was inserted into the canal to ensure proper angle of the cut and a flat cut.       Attention was then directed towards the proximal tibia. The meniscus and soft tissue was removed to expose the medial and lateral tibial plateau. Retractors were placed around the knee to obtain good visualization, then the extramedullary tibial alignment guide was placed in the appropriate position. The 2-10 guide was used to select the resection level of the tibia and the cutting guide was pinned into position. With care directed towards preserving the posterior nerves and blood vessels and the medial collateral ligament, the saw was used to cut the proximal tibia.  Next, after resecting meniscal remnants both medially and laterally, the 10 mm sizing block was put in place to ensure proper alignment.  It showed adequate alignment using the drop heriberto as well as good balancing of soft tissues.     We turned our attention to the back to the distal femur.  The posterior referencing femoral sizing block was used and the femur was measured to be a size 6.  Two holes were drilled to obtain 3 degrees of external rotation with respect to the posterior condylar axis of the femur.  The femoral cutting block was then slid over the pins and fixed onto the bone.  The anterior cut, posterior cut, anterior and posterior chamfer cuts were made.  The block was then removed and excess bone fragments were removed.   Now we visualized the posterior knee. A curved osteotome was used to remove posterior osteophytes. Our anesthetic injection was then introduced through the posterior capsule with extreme care directed towards not injuring the posterior neurovascular structures. The trial components (femur size 6) were placed and confirmed appropriate sizing of the flexion and extension gaps.  The drop heriberto was used to confirm proper alignment of the cut.  A range of motion of 0 to 130 degrees  with a well balanced knee in both flexion and extension was obtained. The excess bone was removed.  The lug holes were drilled.      The patella was then exposed.  Soft tissues were removed around the patella for visualization. The patella thickness was measured with a caliper to be 22, and a measured resection of 8 mm was made.  A caliper was then used to measure the residual thickness of the patella to be 14.  The patella was measured with the lollipops and found to be a size 32. The drill guide was placed and the three lug holes were drilled. The patella trial was then placed and the knee was ranged. The patella was found to track well.  Touniquet was inflated.    The femoral trial component was removed the proximal tibia was again exposed and sized. The tibia was measured to be a size E.   The tibial trial was placed into proper rotation and pinned into place.  The stove pipe was used to guide the drill, and then the wing punch was used. The tibial trial was then removed.      The patellar and femoral trials were removed. Pulsitile lavage was used to clean the bone in preparation for cementing. The bone was dried. Cement was mixed, and then all the components were cemented into place. While the cement was hardening, the remainder of the anesthetic injection was spread around the deep retinacular layer and the subcutaneous layer with a spinal needle.   The knee was irrigated with betadine lavage for approximately 3 minutes.  Once the cement had hardened, the excess cement was removed.    The tourniquet was released.  Hemostasis was obtained.       The MC 11 mm liner fit best and the knee had full extension to 130 degrees and was stable to anterior and posterior stress in flexion and extension as well as varus/valgus stress in 0, 30, and 90 degrees of flexion.  The trial liner was removed and the real liner was placed.       The knee was again copiously irrigated.   Closure was performed with a #1 symmetric Vicryl  interrupted sutures in the retinacular layer, 0 Vicryl and 2-0 Vicryl interrupted suture in the  subcutaneous tissues, and 3-0 monocryl in the skin.  A sterile dressing was applied.  The patient was then awakened, transferred to the [place], and brought to the recovery room in stable condition. There were no complications.     POSTOPERATIVE PLAN:  Weight bearing: Weightbearing as tolerated  Anticoagulation: Aspirin 162 mg daily for 4 weeks  Precautions: No precautions  Pain control and monitoring  XR in PACU  PT/OT  Discharge today or tomorrow when pain well controlled and ambulating safely.        Ernesto Avelar MD      Adult Reconstruction  [facility] Department of Orthopaedic Surgery  Pager [phone]

## 2022-02-04 ENCOUNTER — APPOINTMENT (OUTPATIENT)
Dept: GENERAL RADIOLOGY | Facility: CLINIC | Age: 72
End: 2022-02-04
Attending: INTERNAL MEDICINE
Payer: COMMERCIAL

## 2022-02-04 ENCOUNTER — APPOINTMENT (OUTPATIENT)
Dept: PHYSICAL THERAPY | Facility: CLINIC | Age: 72
End: 2022-02-04
Attending: STUDENT IN AN ORGANIZED HEALTH CARE EDUCATION/TRAINING PROGRAM
Payer: COMMERCIAL

## 2022-02-04 ENCOUNTER — APPOINTMENT (OUTPATIENT)
Dept: OCCUPATIONAL THERAPY | Facility: CLINIC | Age: 72
End: 2022-02-04
Attending: STUDENT IN AN ORGANIZED HEALTH CARE EDUCATION/TRAINING PROGRAM
Payer: COMMERCIAL

## 2022-02-04 LAB
ANION GAP SERPL CALCULATED.3IONS-SCNC: 4 MMOL/L (ref 3–14)
BASE EXCESS BLDA CALC-SCNC: -2.4 MMOL/L (ref -9–1.8)
BUN SERPL-MCNC: 35 MG/DL (ref 7–30)
CALCIUM SERPL-MCNC: 8.7 MG/DL (ref 8.5–10.1)
CHLORIDE BLD-SCNC: 107 MMOL/L (ref 94–109)
CO2 SERPL-SCNC: 25 MMOL/L (ref 20–32)
CREAT SERPL-MCNC: 1.57 MG/DL (ref 0.52–1.04)
GFR SERPL CREATININE-BSD FRML MDRD: 35 ML/MIN/1.73M2
GLUCOSE BLD-MCNC: 149 MG/DL (ref 70–99)
GLUCOSE BLDC GLUCOMTR-MCNC: 124 MG/DL (ref 70–99)
GLUCOSE BLDC GLUCOMTR-MCNC: 133 MG/DL (ref 70–99)
GLUCOSE BLDC GLUCOMTR-MCNC: 185 MG/DL (ref 70–99)
GLUCOSE BLDC GLUCOMTR-MCNC: 191 MG/DL (ref 70–99)
HCO3 BLD-SCNC: 25 MMOL/L (ref 21–28)
HGB BLD-MCNC: 11.3 G/DL (ref 11.7–15.7)
O2/TOTAL GAS SETTING VFR VENT: 24 %
PCO2 BLD: 52 MM HG (ref 35–45)
PH BLD: 7.29 [PH] (ref 7.35–7.45)
PO2 BLD: 101 MM HG (ref 80–105)
POTASSIUM BLD-SCNC: 4.8 MMOL/L (ref 3.4–5.3)
SODIUM SERPL-SCNC: 136 MMOL/L (ref 133–144)

## 2022-02-04 PROCEDURE — 97530 THERAPEUTIC ACTIVITIES: CPT | Mod: GP | Performed by: PHYSICAL THERAPIST

## 2022-02-04 PROCEDURE — 36415 COLL VENOUS BLD VENIPUNCTURE: CPT | Performed by: STUDENT IN AN ORGANIZED HEALTH CARE EDUCATION/TRAINING PROGRAM

## 2022-02-04 PROCEDURE — 99214 OFFICE O/P EST MOD 30 MIN: CPT | Performed by: INTERNAL MEDICINE

## 2022-02-04 PROCEDURE — 250N000013 HC RX MED GY IP 250 OP 250 PS 637: Performed by: CLINICAL NURSE SPECIALIST

## 2022-02-04 PROCEDURE — 97165 OT EVAL LOW COMPLEX 30 MIN: CPT | Mod: GO

## 2022-02-04 PROCEDURE — 97116 GAIT TRAINING THERAPY: CPT | Mod: GP | Performed by: PHYSICAL THERAPIST

## 2022-02-04 PROCEDURE — 250N000013 HC RX MED GY IP 250 OP 250 PS 637: Performed by: INTERNAL MEDICINE

## 2022-02-04 PROCEDURE — 250N000011 HC RX IP 250 OP 636: Performed by: STUDENT IN AN ORGANIZED HEALTH CARE EDUCATION/TRAINING PROGRAM

## 2022-02-04 PROCEDURE — 94660 CPAP INITIATION&MGMT: CPT

## 2022-02-04 PROCEDURE — 82310 ASSAY OF CALCIUM: CPT | Performed by: INTERNAL MEDICINE

## 2022-02-04 PROCEDURE — 71045 X-RAY EXAM CHEST 1 VIEW: CPT

## 2022-02-04 PROCEDURE — 71045 X-RAY EXAM CHEST 1 VIEW: CPT | Mod: 26 | Performed by: RADIOLOGY

## 2022-02-04 PROCEDURE — 85018 HEMOGLOBIN: CPT | Performed by: STUDENT IN AN ORGANIZED HEALTH CARE EDUCATION/TRAINING PROGRAM

## 2022-02-04 PROCEDURE — 97535 SELF CARE MNGMENT TRAINING: CPT | Mod: GO

## 2022-02-04 PROCEDURE — 999N000157 HC STATISTIC RCP TIME EA 10 MIN

## 2022-02-04 PROCEDURE — 99207 PR CDG-CODE CATEGORY CHANGED: CPT | Performed by: INTERNAL MEDICINE

## 2022-02-04 PROCEDURE — 250N000013 HC RX MED GY IP 250 OP 250 PS 637: Performed by: STUDENT IN AN ORGANIZED HEALTH CARE EDUCATION/TRAINING PROGRAM

## 2022-02-04 PROCEDURE — 82962 GLUCOSE BLOOD TEST: CPT

## 2022-02-04 PROCEDURE — 82803 BLOOD GASES ANY COMBINATION: CPT | Performed by: INTERNAL MEDICINE

## 2022-02-04 RX ADMIN — OXYCODONE HYDROCHLORIDE 5 MG: 5 TABLET ORAL at 02:34

## 2022-02-04 RX ADMIN — ATORVASTATIN CALCIUM 40 MG: 40 TABLET, FILM COATED ORAL at 08:16

## 2022-02-04 RX ADMIN — HYDROMORPHONE HYDROCHLORIDE 0.4 MG: 0.2 INJECTION, SOLUTION INTRAMUSCULAR; INTRAVENOUS; SUBCUTANEOUS at 09:44

## 2022-02-04 RX ADMIN — OXYCODONE HYDROCHLORIDE 10 MG: 10 TABLET ORAL at 08:15

## 2022-02-04 RX ADMIN — ACETAMINOPHEN 975 MG: 325 TABLET, FILM COATED ORAL at 20:05

## 2022-02-04 RX ADMIN — ASPIRIN 81 MG: 81 TABLET, COATED ORAL at 08:16

## 2022-02-04 RX ADMIN — LEVOTHYROXINE SODIUM 125 MCG: 25 TABLET ORAL at 08:16

## 2022-02-04 RX ADMIN — GABAPENTIN 100 MG: 100 CAPSULE ORAL at 20:05

## 2022-02-04 RX ADMIN — SENNOSIDES AND DOCUSATE SODIUM 1 TABLET: 50; 8.6 TABLET ORAL at 08:16

## 2022-02-04 RX ADMIN — OXYCODONE HYDROCHLORIDE 10 MG: 10 TABLET ORAL at 21:35

## 2022-02-04 RX ADMIN — METOPROLOL SUCCINATE 100 MG: 25 TABLET, EXTENDED RELEASE ORAL at 08:16

## 2022-02-04 RX ADMIN — SENNOSIDES AND DOCUSATE SODIUM 1 TABLET: 50; 8.6 TABLET ORAL at 20:06

## 2022-02-04 RX ADMIN — TIZANIDINE 4 MG: 4 TABLET ORAL at 15:01

## 2022-02-04 RX ADMIN — TIZANIDINE 4 MG: 4 TABLET ORAL at 21:36

## 2022-02-04 RX ADMIN — ASPIRIN 81 MG: 81 TABLET, COATED ORAL at 20:06

## 2022-02-04 RX ADMIN — SERTRALINE HYDROCHLORIDE 200 MG: 100 TABLET ORAL at 08:16

## 2022-02-04 RX ADMIN — ACETAMINOPHEN 975 MG: 325 TABLET, FILM COATED ORAL at 13:24

## 2022-02-04 RX ADMIN — ACETAMINOPHEN 975 MG: 325 TABLET, FILM COATED ORAL at 05:38

## 2022-02-04 RX ADMIN — OXYCODONE HYDROCHLORIDE 10 MG: 10 TABLET ORAL at 13:24

## 2022-02-04 ASSESSMENT — ACTIVITIES OF DAILY LIVING (ADL): IADL_COMMENTS: WILL HAVE ASSIST WITH IADLS

## 2022-02-04 NOTE — DISCHARGE INSTRUCTIONS
Check your blood pressure  And write them down and take it in when you see your primary care. hodl lisinopril for now  You also need and out patient   sleep study as your oxygenation was low over night

## 2022-02-04 NOTE — PLAN OF CARE
Patient vital signs are at baseline: Yes  Patient able to ambulate as they were prior to admission or with assist devices provided by therapies during their stay:  Yes  Patient MUST void prior to discharge:  Yes  Patient able to tolerate oral intake:  Yes  Pain has adequate pain control using Oral analgesics:  Yes    Pt arrived to unit at 1215 and was oriented to room and call light  VS: VSS   O2: >90% on 2L NC   Output: Voiding adequately; first PVR 99   Last BM: 2/2   Activity: WBAT; 1A with gait belt and FWW. Ambulated in HW with PT this afternoon   Up for meals? Yes   Skin: Incision R knee   Pain: 5mg oxycodone x2 since surgery (Needs to take with food)   CMS: Pt was unable to dorsiflex upon arrival. [place] assessed pt; likely d/t block. Movement improving. Still sl weak, but able to dorsi/plantarflex BLE   Dressing: ACE CDI   Diet: Regular; BG 99 before supper   LDA: PIV SL   Equipment: PCDs, IV pole, capnography, gait belt, FWW   Plan: TBD   Additional Info: Pt received spinal anesthesia + cocktail. EBL 50

## 2022-02-04 NOTE — PHARMACY-ADMISSION MEDICATION HISTORY
Admission Medication History Completed by Pharmacy    See [facility] Admission Navigator for allergy information, preferred outpatient pharmacy, prior to admission medications and immunization status.     Medication History Sources:     Patient    surescripts fill history     Changes made to PTA medication list (reason):    Added: None    Deleted: None    Changed: clarified sigs of multiple meds    Additional Information:    Lorazepam prn dental appts - last filled 0.5 mg tablets on 1/2/21 for #4 tablets ( 2 day supply)    Prior to Admission medications    Medication Sig Last Dose Taking? Auth Provider   amLODIPine (NORVASC) 10 MG tablet Take 10 mg by mouth daily  2/3/2022 at 0430 Yes Reported, Patient   atorvastatin (LIPITOR) 40 MG tablet Take 40 mg by mouth daily  2/3/2022 at 0430 Yes Reported, Patient   levothyroxine (SYNTHROID/LEVOTHROID) 125 MCG tablet TAKE 1 TABLET BY MOUTH EVERY DAY 2/3/2022 at 0430 Yes Reported, Patient   lisinopril (ZESTRIL) 40 MG tablet Take 40 mg by mouth daily Past Week at Unknown time Yes Unknown, Entered By History   LORazepam (ATIVAN) 0.5 MG tablet Take 1-2 tabs 30-60 min prior to dental appointment. Unknown at Unknown time Yes Reported, Patient   metoprolol succinate ER (TOPROL-XL) 100 MG 24 hr tablet Take 100 mg by mouth daily  2/3/2022 at 0430 Yes Reported, Patient   sertraline (ZOLOFT) 100 MG tablet Take 200 mg by mouth 2/3/2022 at 0430 Yes Reported, Patient   traZODone (DESYREL) 50 MG tablet TAKE 1 TO 2 TABLETS BY MOUTH AT BEDTIME AS NEEDED FOR SLEEP Past Week at Unknown time Yes Reported, Patient       Date completed: 02/03/22    Medication history completed by: Kedar Velasco [facility]

## 2022-02-04 NOTE — PLAN OF CARE
PATIENT LOOKING GOOD NOW AND BREATHING WELL WITH BIPAP, vitals stable. Awake used bedpan, was wet whole bed changed and requested pain medication right hip 5 mg Oxycodone given will monitor. RT unable to draw ABG called , HE SAID if patient appears well no need for ABG AND CAN STOP BIPAP while awake or depending with the progress. Will monitor. Patient said One time she took Oxycodone she vomited but so far no problem noted with it.

## 2022-02-04 NOTE — PROGRESS NOTES
"  Orthopaedic Surgery Daily Progress Note     Subjective: Shannan Riojas is a 72 year old female POD # 1 s/p right TKA  Pain is well controlled. Tolerating an oral diet. No fever, chills. No chest pain or shortness of breath. No abdominal pain, nausea, vomiting. No diarrhea or constipation. No urinary difficulties.     Physical Exam   /55 (BP Location: Left arm)   Pulse 59   Temp 96.8  F (36  C) (Axillary)   Resp 8   Ht 1.651 m (5' 5\")   Wt 104.8 kg (231 lb 0.7 oz)   SpO2 96%   BMI 38.45 kg/m      Intake/Output Summary (Last 24 hours) at 2/4/2022 0539  Last data filed at 2/3/2022 1510  Gross per 24 hour   Intake 850 ml   Output 300 ml   Net 550 ml     General: Alert, well-appearing in no acute distress.  Respiratory: Non-labored breathing.   Cardiovascular: Extremities warm and well perfused  Extremities: moving all four extremities.   RLE:  -Sens: SILT dp/sp/s/s/t  -Motor: 5/5 EHL/FHL/TA/GSc  -Vasc: 2+ pulses, wwp, brisk cap refill    Dressing CDI    Skin: As noted above. No rashes or lesions appreciated.    Labs    Complete Blood Count   No lab results found in last 7 days.  Basic Metabolic Panel  Recent Labs   Lab 02/03/22  2243 02/03/22  1637 02/03/22  0934 02/03/22  0644 02/03/22  0602   POTASSIUM  --   --   --  4.8  --    * 95 144*  --  133*     Coagulation Profile  No lab results found in last 7 days.    Assessment/Plan:  Assessment and Plan:  Shannan Riojas is a 72 year old female s/p right TKA on 2/3 with Dr. Avelar      Orthopedic Surgery Primary  Activity: Up with assist until independent. Knee ROM as tolerated.   Weight bearing status: WBAT.  Pain management: Transition from IV to PO as tolerated.    Antibiotics: Ancef x 24 hours  Diet: Begin with clear fluids and progress diet as tolerated.   DVT prophylaxis:  gm  Imaging: completed   Labs: Monitor Hgb   Bracing/Splinting: None.   Dressings: Keep clean, dry and intact   Elevation: Elevate RLE on pillows to keep above " the level of the heart as much as possible - no pillows under knee.   Physical Therapy/Occupational Therapy: Eval and treat.  Consults: IM, SW.  Follow-up: Clinic 2 weeks  Disposition: likely discharge to home     Rick Lam MD   Orthopaedic Surgery Resident, PGY-4  P: [phone]    Discussed with Dr. Avelar

## 2022-02-04 NOTE — PROGRESS NOTES
Care Management Discharge Note    Discharge Date: 02/04/2022       Discharge Disposition:  Home     Discharge Services:  Outpatient physical therapy    [facility]  [address]  [place] [address]  Phone [phone]  Fax [phone]    Discharge DME:      Discharge Transportation:  Friend or family    Handoff Referral Completed: Yes    Additional Information:  Outpatient physical therapy referral sent to centralized scheduling. RNCC available as needed.    Sara Tatum RN, BSN  Care Coordinator, [facility] Orth[facility]  Phone [phone]  Pager [phone]

## 2022-02-04 NOTE — PROGRESS NOTES
Acupuncture Clinical Internship Intake and Treatment Documentation   [facility]     Date:  2/4/2022  Patient Name:  Shannan Riojas   YOB: 1950     Repeat Patient:  no  Has patient had acupoint/acupressure treatment before:  yes    Signed consent placed in the medical record:  yes  Patient/Family verbalizes understanding of risks and benefits:  yes  Required information provided to patient:  yes    Diagnosis:  Primary osteoarthritis of right knee [M17.11]  S/P total knee arthroplasty, right [Z96.651]    Patient condition and treatment:  LBP and R knee pain.  Reason for Intervention Today/Chief Complaint: Pain relief    Isolation:  No  Type:  None    PRE-SCORE:  moderate    Other Western medical information:  N/A    Medications   Current Facility-Administered Medications:     [START ON 2/6/2022] acetaminophen (TYLENOL) tablet 650 mg, 650 mg, Oral, Q4H PRN, Ernesto Avelar MD    acetaminophen (TYLENOL) tablet 975 mg, 975 mg, Oral, Q8H, Ernesto Avelar MD, 975 mg at 02/04/22 1324    aspirin EC tablet 81 mg, 81 mg, Oral, BID, Ernesto Avelar MD, 81 mg at 02/04/22 0816    atorvastatin (LIPITOR) tablet 40 mg, 40 mg, Oral, Daily, Stephanie Bazan MD, 40 mg at 02/04/22 0816    benzocaine-menthol (CEPACOL) 15-3.6 MG lozenge 1 lozenge, 1 lozenge, Buccal, Q1H PRN, Ernesto Avelar MD    bisacodyl (DULCOLAX) Suppository 10 mg, 10 mg, Rectal, Daily PRN, Ernesto Avelar MD    carboxymethylcellulose PF (REFRESH PLUS) 0.5 % ophthalmic solution 1 drop, 1 drop, Both Eyes, Q1H PRN, Jeffrey Tam MD    glucose gel 15-30 g, 15-30 g, Oral, Q15 Min PRN **OR** dextrose 50 % injection 25-50 mL, 25-50 mL, Intravenous, Q15 Min PRN **OR** glucagon injection 1 mg, 1 mg, Subcutaneous, Q15 Min PRN, Stephanie Bazan MD    gabapentin (NEURONTIN) capsule 100 mg, 100 mg, Oral, At Bedtime, Ernesto Avelar MD, 100 mg at 02/03/22 2130    HYDROmorphone (DILAUDID) injection 0.2 mg,  0.2 mg, Intravenous, Q2H PRN **OR** HYDROmorphone (DILAUDID) injection 0.4 mg, 0.4 mg, Intravenous, Q2H PRN, Ernesto Avelar MD, 0.4 mg at 02/04/22 0944    insulin aspart (NovoLOG) injection (RAPID ACTING), 1-3 Units, Subcutaneous, TID AC, Stephanie Bazan MD    insulin aspart (NovoLOG) injection (RAPID ACTING), 1-3 Units, Subcutaneous, At Bedtime, Stephanie Bazan MD    levothyroxine (SYNTHROID/LEVOTHROID) tablet 125 mcg, 125 mcg, Oral, Daily, Stephanie Bazan MD, 125 mcg at 02/04/22 0816    lidocaine (LMX4) cream, , Topical, Q1H PRN, Ernesto Avelar MD    lidocaine 1 % 0.1-1 mL, 0.1-1 mL, Other, Q1H PRN, Ernesto Avelar MD    magnesium hydroxide (MILK OF MAGNESIA) suspension 30 mL, 30 mL, Oral, Daily PRN, Ernesto Avelar MD    metoprolol succinate ER (TOPROL-XL) 24 hr tablet 100 mg, 100 mg, Oral, Daily, Stephanie Bazan MD, 100 mg at 02/04/22 0816    naloxone (NARCAN) injection 0.2 mg, 0.2 mg, Intravenous, Q2 Min PRN **OR** naloxone (NARCAN) injection 0.4 mg, 0.4 mg, Intravenous, Q2 Min PRN **OR** naloxone (NARCAN) injection 0.2 mg, 0.2 mg, Intramuscular, Q2 Min PRN **OR** naloxone (NARCAN) injection 0.4 mg, 0.4 mg, Intramuscular, Q2 Min PRN, Ernesto Avelar MD    No lozenges or gum should be given while patient on BIPAP/AVAPS/AVAPS AE, , Does not apply, Continuous PRN, Jeffrey Tam MD    ondansetron (ZOFRAN-ODT) ODT tab 4 mg, 4 mg, Oral, Q6H PRN **OR** ondansetron (ZOFRAN) injection 4 mg, 4 mg, Intravenous, Q6H PRN, Ernesto Avelar MD    oxyCODONE (ROXICODONE) tablet 5 mg, 5 mg, Oral, Q4H PRN, 5 mg at 02/04/22 0234 **OR** oxyCODONE IR (ROXICODONE) tablet 10 mg, 10 mg, Oral, Q4H PRN, Ernesto Avelar MD, 10 mg at 02/04/22 1324    Patient may continue current oral medications, , Does not apply, Continuous PRN, Jeffrey Tam MD    polyethylene glycol (MIRALAX) Packet 17 g, 17 g, Oral, Daily, Ernesto Avelar MD    prochlorperazine (COMPAZINE) injection 5 mg, 5 mg,  Intravenous, Q6H PRN **OR** prochlorperazine (COMPAZINE) tablet 5 mg, 5 mg, Oral, Q6H PRN, Ernesto Avelar MD    senna-docusate (SENOKOT-S/PERICOLACE) 8.6-50 MG per tablet 1 tablet, 1 tablet, Oral, BID, Ernesto Avelar MD, 1 tablet at 02/04/22 0816    sertraline (ZOLOFT) tablet 200 mg, 200 mg, Oral, Daily, Stephanie Bazan MD, 200 mg at 02/04/22 0816    sodium chloride (PF) 0.9% PF flush 3 mL, 3 mL, Intracatheter, Q8H, Ernesto Avelar MD, 3 mL at 02/04/22 1503    sodium chloride (PF) 0.9% PF flush 3 mL, 3 mL, Intracatheter, q1 min prn, Ernesto Avelar MD    tiZANidine (ZANAFLEX) tablet 4 mg, 4 mg, Oral, Q6H PRN, Rebekah Ansari APRN CNS, 4 mg at 02/04/22 1501    traZODone (DESYREL) tablet 50 mg, 50 mg, Oral, At Bedtime PRN, Stephanie Bazan MD  Current Outpatient Medications   Medication Sig Dispense Refill    acetaminophen (TYLENOL) 325 MG tablet Take 2 tablets (650 mg) by mouth every 4 hours as needed for other (mild pain) 100 tablet 0    aspirin 81 MG EC tablet Take 1 tablet (81 mg) by mouth 2 times daily 60 tablet 0    lisinopril (ZESTRIL) 40 MG tablet Take 1 tablet (40 mg) by mouth daily Restart once your blood pressure  Stay over 120 mmHg  0    oxyCODONE (ROXICODONE) 5 MG tablet Take 1-2 tablets (5-10 mg) by mouth every 3 hours as needed for pain (Moderate to Severe) 30 tablet 0    polyethylene glycol (MIRALAX) 17 g packet Take 17 g by mouth daily 7 packet 0    senna-docusate (SENOKOT-S/PERICOLACE) 8.6-50 MG tablet Take 1-2 tablets by mouth 2 times daily Take while on oral narcotics to prevent or treat constipation. 30 tablet 0    tiZANidine (ZANAFLEX) 4 MG tablet Take 1 tablet (4 mg) by mouth every 6 hours as needed for muscle spasms 40 tablet 0       Pre-Treatment Assessment  Chief Complaint/ Reason for Intervention Today:  LBP and R knee pain  Chief Complaint Pre-Score:  moderate   Describe:  Pt states she has sharp LBP, rates it an 8/10 in severity. States moving and positioning to side  helps reduce pain, states laying on back makes LBP worse. States R knee pain is a 6/10, and she experiences numbness and tingling.   Pain Location:  Lumbar region and R knee.  Pre Session Pain:  Moderate  Pre Session Anxiety:  Other   Pre Session Nausea:  None    10 Traditional Chinese Medicine Assessment Questions  - Cold/ Heat:  Pt states she runs neutral but can easily run hot, especially when exercising.   - Sweat:  Pt states she easily sweats and experiences night sweats.  - Headaches/Body aches:  Pt denies any headaches and any additional body aches.  - Chest/Abdomen:  Pt denies palpitations, SOB, chest oppression, chest pain, and rib-side pain.   - Digestion:  Pt states appetite has been decreased though she is craving sweets and prefers warm beverages, states she has 2 cups of coffee per day.  - Bowel Movement/Urination:  Pt states she has been constipated since being admitted but experiences regular BM's 2x a day at home. States I = O. Denies any pain or incontinence.   - Hearing/Vision:  States she has experienced floaters in vision since she was 11 years old. She denies any hearing issues.  - Sleep (prior to hospital):  Pt states she is getting 4 hours of sleep and is trying to go to bed earlier.   - Energy:  Pt states energy has been low.   - Emotions:  Pt states she does experience depression.  - Ob Gyn:  N/A  - Miscellaneous:  N/A    Traditional Chinese Medicine Assessment  - TONGUE:  Slightly purple, cracked down spleen and stomach region, quivering  - PULSE:  Thin, deep and wiry   - OBSERVATIONS:  N/A    Traditional Chinese Medicine Diagnosis  - BRANCH:  Liver Blood Stasis  - ROOT:  Kidney Qi Deficiency    Traditional Chinese Medicine Treatment  - ACUPRESSURE: Auricular - Kidney, Liver, Aparicio Men, Point 0    [place]: UB 62, SI 3, Du 20 Right Franck Tong Xuan      Magnet informed consent signed and given:  yes    Post Treatment Assessment  Chief complaint post score:  better  Post Session Observation:   N/A  Patient/Family Education:  N/A  Verbal information provided:  yes  Written information provided:  yes  All questions answered at time of treatment:  yes    Treatment/Procedure(s) performed by:  Gege Unger    Date: 2/4/2022     I attest that this acupuncture treatment was done under my supervision and this note is complete and true.     Supervising acupuncturist:  Christiano Banda LAc [facility]    [facility] Acupuncture license #: [id number]  P: [phone]

## 2022-02-04 NOTE — DISCHARGE SUMMARY
[facility] Orthopaedic Surgery Discharge Summary    Shannan Riojas MRN# [id number]   Age: 72 year old YOB: 1950       Date of Admission:  2/3/2022  Date of Discharge::  2/6/2022   Admitting Physician:  Ernesto Avelar MD  Discharge To:  Home   Primary Care Physician:      Nia Mayo          Admission Diagnoses:   Pre-operative diagnosis:  71-year-old female presenting with chronic right knee pain due to underlying end-stage osteoarthritic changes most notably in the medial compartment.  Significantly reducing her quality of life and insufficiently responding to nonoperative treatment measures initiated.    Discharge diagnosis        Same as pre-operative diagnosis         Procedures Performed:   Right TKA          Consultations:   INTERNAL MEDICINE ADULT IP CONSULT FOR [facility]  PHYSICAL THERAPY ADULT IP CONSULT  OCCUPATIONAL THERAPY ADULT IP CONSULT          Brief History:   71 year old female presented to our clinic because of chronic right knee pain.  This pain has been present for multiple years.  It and started approximately 3 years after she fell twisted her knee. The patient has failed conservative treatment of symptoms and desires more definitive intervention. Therefore, after reviewing non-operative and operative options including the risks and benefits associated with each, the patient elected to proceed with the above stated procedure.            Hospital Course:   Patient did well post operatively.  Transitioned from iv medication to oral meds.  Tolerated diet, resumed normal bowel and bladder function.  Was seen by PT/OT prior to discharge and met goals of therapy.      Continue  mg for 4 weeks.  Plan to follow up in clinic with Dr. Avelar in 2 weeks.           Medications Prior to Admission:     Medications Prior to Admission   Medication Sig Dispense Refill Last Dose     amLODIPine (NORVASC) 10 MG tablet Take 10 mg by mouth daily    2/3/2022 at 0430      atorvastatin (LIPITOR) 40 MG tablet Take 40 mg by mouth daily    2/3/2022 at 0430     levothyroxine (SYNTHROID/LEVOTHROID) 125 MCG tablet TAKE 1 TABLET BY MOUTH EVERY DAY   2/3/2022 at 0430     lisinopril (ZESTRIL) 40 MG tablet Take 40 mg by mouth daily   Past Week at Unknown time     LORazepam (ATIVAN) 0.5 MG tablet Take 1-2 tabs 30-60 min prior to dental appointment.   Unknown at Unknown time     metoprolol succinate ER (TOPROL-XL) 100 MG 24 hr tablet Take 100 mg by mouth daily    2/3/2022 at 0430     sertraline (ZOLOFT) 100 MG tablet Take 200 mg by mouth   2/3/2022 at 0430     traZODone (DESYREL) 50 MG tablet TAKE 1 TO 2 TABLETS BY MOUTH AT BEDTIME AS NEEDED FOR SLEEP   Past Week at Unknown time     [DISCONTINUED] lisinopril (ZESTRIL) 40 MG tablet    Past Week at Unknown time            Discharge Medications:        Review of your medicines      UNREVIEWED medicines. Ask your doctor about these medicines      Dose / Directions   * lisinopril 40 MG tablet  Commonly known as: ZESTRIL  Used for: Essential hypertension  Ask about: Which instructions should I use?      Dose: 40 mg  Take 1 tablet (40 mg) by mouth daily Restart once your blood pressure  Stay over 120 mmHg  Refills: 0     * lisinopril 40 MG tablet  Commonly known as: ZESTRIL  Ask about: Which instructions should I use?      Dose: 40 mg  Take 40 mg by mouth daily  Refills: 0         * This list has 2 medication(s) that are the same as other medications prescribed for you. Read the directions carefully, and ask your doctor or other care provider to review them with you.            START taking      Dose / Directions   acetaminophen 325 MG tablet  Commonly known as: TYLENOL  Used for: Osteoarthrosis, localized, primary, knee, right      Dose: 650 mg  Take 2 tablets (650 mg) by mouth every 4 hours as needed for other (mild pain)  Quantity: 100 tablet  Refills: 0     aspirin 81 MG EC tablet  Used for: Osteoarthrosis, localized, primary, knee, right       Dose: 81 mg  Take 1 tablet (81 mg) by mouth 2 times daily  Quantity: 60 tablet  Refills: 0     celecoxib 200 MG capsule  Commonly known as: celeBREX  Used for: Osteoarthrosis, localized, primary, knee, right      Dose: 200 mg  Take 1 capsule (200 mg) by mouth daily for 14 days Do not take within 6 hours of ibuprofen (MOTRIN, ADVIL) or ketorolac (TORADOL) if prescribed.  Quantity: 14 capsule  Refills: 0     oxyCODONE 5 MG tablet  Commonly known as: ROXICODONE  Used for: Osteoarthrosis, localized, primary, knee, right      Dose: 5-10 mg  Take 1-2 tablets (5-10 mg) by mouth every 3 hours as needed for pain (Moderate to Severe)  Quantity: 30 tablet  Refills: 0     polyethylene glycol 17 g packet  Commonly known as: MIRALAX  Used for: Osteoarthrosis, localized, primary, knee, right      Dose: 1 packet  Take 17 g by mouth daily  Quantity: 7 packet  Refills: 0     senna-docusate 8.6-50 MG tablet  Commonly known as: SENOKOT-S/PERICOLACE  Used for: Osteoarthrosis, localized, primary, knee, right      Dose: 1-2 tablet  Take 1-2 tablets by mouth 2 times daily Take while on oral narcotics to prevent or treat constipation.  Quantity: 30 tablet  Refills: 0        CONTINUE these medicines which have NOT CHANGED      Dose / Directions   amLODIPine 10 MG tablet  Commonly known as: NORVASC      Dose: 10 mg  Take 10 mg by mouth daily  Refills: 0     atorvastatin 40 MG tablet  Commonly known as: LIPITOR      Dose: 40 mg  Take 40 mg by mouth daily  Refills: 0     levothyroxine 125 MCG tablet  Commonly known as: SYNTHROID/LEVOTHROID      TAKE 1 TABLET BY MOUTH EVERY DAY  Refills: 0     LORazepam 0.5 MG tablet  Commonly known as: ATIVAN      Take 1-2 tabs 30-60 min prior to dental appointment.  Refills: 0     metoprolol succinate  MG 24 hr tablet  Commonly known as: TOPROL-XL      Dose: 100 mg  Take 100 mg by mouth daily  Refills: 0     sertraline 100 MG tablet  Commonly known as: ZOLOFT      Dose: 200 mg  Take 200 mg by  "mouth  Refills: 0     traZODone 50 MG tablet  Commonly known as: DESYREL      TAKE 1 TO 2 TABLETS BY MOUTH AT BEDTIME AS NEEDED FOR SLEEP  Refills: 0           Where to get your medicines      These medications were sent to [facility] [place], [place] - [address]  [address] [facility] [address], [facility] [address]    Phone: [phone]     acetaminophen 325 MG tablet    aspirin 81 MG EC tablet    celecoxib 200 MG capsule    oxyCODONE 5 MG tablet    polyethylene glycol 17 g packet    senna-docusate 8.6-50 MG tablet                 Pending Results at Discharge:   None         Discharge Instructions:     Discharge Procedure Orders   Reason for your hospital stay   Order Comments: S/p tka right     When to call - Contact Surgeon Team   Order Comments: You may experience symptoms that require follow-up before your scheduled appointment. Refer to the \"Stoplight Tool\" for instructions on when to contact your Surgeon Team if you are concerned about pain control, blood clots, constipation, or if you are unable to urinate.     When to call - Reach out to Urgent Care   Order Comments: If you are not able to reach your Surgeon Team and you need immediate care, go to the Orthopedic Walk-in Clinic or Urgent Care at your Surgeon's office.  Do NOT go to the Emergency Room unless you have shortness of breath, chest pain, or other signs of a medical emergency.     When to call - Reasons to Call 911   Order Comments: Call 911 immediately if you experience sudden-onset chest pain, arm weakness/numbness, slurred speech, or shortness of breath     Discharge Instruction - Breathing exercises   Order Comments: Perform breathing exercises using your Incentive Spirometer 10 times per hour while awake for 2 weeks.     Symptoms - Fever Management   Order Comments: A low grade fever can be expected after surgery.  Use acetaminophen (TYLENOL) as needed for fever management.  Contact your Surgeon Team if you have a fever greater " than 101.5 F, chills, and/or night sweats.     Symptoms - Constipation management   Order Comments: Constipation (hard, dry bowel movements) is expected after surgery due to the combination of being less active, the anesthetic, and the opioid pain medication.  You can do the following to help reduce constipation:  ~  FLUIDS:  Drink clear liquids (water or Gatorade), or juice (apple/prune).  ~  DIET:  Eat a fiber rich diet.    ~  ACTIVITY:  Get up and move around several times a day.  Increase your activity as you are able.  MEDICATIONS:  Reduce the risk of constipation by starting medications before you are constipated.  You can take Miralax   (1 packet as directed) and/or a stool softener (Senokot 1-2 tablets 1-2 times a day).  If you already have constipation and these medications are not working, you can get magnesium citrate and use as directed.  If you continue to have constipation you can try an over the counter suppository or enema.  Call your Surgeon Team if it has been greater than 3 days since your last bowel movement.     Symptoms - Reduced Urine Output   Order Comments: Changes in the amount of fluids you drank before and after surgery may result in problems urinating.  It is important to stay well-hydrated after surgery and drink plenty of water. If it has been greater than 8 hours since you have urinated despite drinking plenty of water, call your Surgeon Team.     Activity - Exercises to prevent blood clots   Order Comments: Unless otherwise directed by your Surgeon team, perform the following exercises at least three times per day for the first four weeks after surgery to prevent blood clots in your legs: 1) Point and flex your feet (Ankle Pumps), 2) Move your ankle around in big circles, 3) Wiggle your toes, 4) Walk, even for short distances, several times a day, will help decrease the risk of blood clots.     Order Specific Question Answer Comments   Is discharge order? Yes      Comfort and Pain  Management - Pain after Surgery   Order Comments: Pain after surgery is normal and expected.  You will have some amount of pain for several weeks after surgery.  Your pain will improve with time.  There are several things you can do to help reduce your pain including: rest, ice, elevation, and using pain medications as needed. Contact your Surgeon Team if you have pain that persists or worsens after surgery despite rest, ice, elevation, and taking your medication(s) as prescribed. Contact your Surgeon Team if you have new numbness, tingling, or weakness in your operative extremity.     Comfort and Pain Management - Swelling after Surgery   Order Comments: Swelling and/or bruising of the surgical extremity is common and may persist for several months after surgery. In addition to frequent icing and elevation, gentle compressive support with an ACE wrap or tubigrip may help with swelling. Apply compression regularly, removing at least twice daily to perform skin checks. Contact your Surgeon Team if your swelling increases and is NOT associated with an increase in your activity level, or if your swelling increases and is associated with redness and pain.     Comfort and Pain Management - Cold therapy   Order Comments: Ice can be used to control swelling and discomfort after surgery. Place a thin towel over your operative site and apply the ice pack overtop. Leave ice pack in place for 20 minutes, then remove for 20 minutes. Repeat this 20 minutes on/20 minutes off routine as often as tolerated.     Medication Instructions - Acetaminophen (TYLENOL) Instructions   Order Comments: You were discharged with acetaminophen (TYLENOL) for pain management after surgery. Acetaminophen most effectively manages pain symptoms when it is taken on a schedule without missing doses (every four, six, or eight hours). Your Provider will prescribe a safe daily dose between 3000 - 4000 mg.  Do NOT exceed this daily dose. Most patients use  acetaminophen for pain control for the first four weeks after surgery.  You can wean from this medication as your pain decreases.     Medication Instructions - NSAID Instructions   Order Comments: You were discharged with an anti-inflammatory medication for pain management to use in combination with acetaminophen (TYLENOL) and the narcotic pain medication.  Take this medication exactly as directed.  You should only take one anti-inflammatory at a time.  Some common anti-inflammatories include: ibuprofen (ADVIL, MOTRIN), naproxen (ALEVE, NAPROSYN), celecoxib (CELEBREX), meloxicam (MOBIC), ketorolac (TORADOL).  Take this medication with food and water.     Medication Instructions - Opioids - Tapering Instructions   Order Comments: In the first three days following surgery, your symptoms may warrant use of the narcotic pain medication every four to six hours as prescribed. This is normal. As your pain symptoms improve, focus your efforts on decreasing (tapering) use of narcotic medications. The most successful tapering strategy is to first, decrease the number of tablets you take every 4-6 hours to the minimum prescribed. Then, increase the amount of time between doses.  For example:  First, taper to   or 1 tablet every 4-6 hours.  Then, taper to   or 1 tablet every 6-8 hours.  Then, taper to   or 1 tablet every 8-10 hours.  Then, taper to   or 1 tablet every 10-12 hours.  Then, taper to   or 1 tablet at bedtime.  The bedtime dose can help with comfort during sleep and is typically the last dose to be discontinued after surgery.     Follow Up Care   Order Comments: Follow-up with your Surgeon Team in 2 weeks for wound check.     Activity - Total Knee Arthroplasty     Order Specific Question Answer Comments   Is discharge order? Yes      Return to Driving   Order Comments: Return to driving - Driving is NOT permitted until directed by your provider. Under no circumstance are you permitted to drive while using narcotic  "pain medications.     Order Specific Question Answer Comments   Is discharge order? Yes      NO Precautions   Order Comments: No precautions directed by your Provider.  You may perform range of motion activities as tolerated.     Order Specific Question Answer Comments   Is discharge order? Yes      Weight bearing as tolerated   Order Comments: Weight bearing as tolerated on your operative extremity.     Order Specific Question Answer Comments   Is discharge order? Yes      Dressing / Wound Care - Wound   Order Comments: You have a clean dressing on your surgical wound. Dressing change instructions as follows: remove your dressing in 7-10 days, and leave incision open to air. Contact your Surgeon Team if you have increased redness, warmth around the surgical wound, and/or drainage from the surgical wound.     Dressing / Wound Care - NO Tub Bathing   Order Comments: Tub bathing, swimming, or any other activities that will cause your incision to be submerged in water should be avoided until allowed by your Surgeon.     Medication instructions -  Anticoagulation - aspirin   Order Comments: Take the aspirin as prescribed for a total of four weeks after surgery.  This is given to help minimize your risk of blood clot.     Comfort and Pain Management - LOWER Extremity Elevation   Order Comments: Swelling is expected for several months after surgery. This type of swelling is usually associated with gravity and activity, and can be improved with elevation.   The best way to do this is to get your \"toes above your nose\" by laying down and placing several pillows lengthwise under your calf and heel. When elevating your leg keep your knee completely straight. Perform this elevation as often as possible especially for the first two weeks after surgery.     Medication Instructions - Opioid Instructions (Greater than or equal to 65 years)   Order Comments: You were discharged with an opioid medication (hydromorphone, oxycodone, " hydrocodone, or tramadol). This medication should only be taken for breakthrough pain that is not controlled with acetaminophen (TYLENOL). If you rate your pain less than 3 you do not need this medication.  Pain rating 0-3:  You do not need this medication  Pain rating 4-6:  Take 1/2 tablet every 4-6 hours as needed  Pain rating 7-10:  Take 1 tablet every 4-6 hours as needed  Do not exceed 4 tablets per day     Dressing / Wound care - Shower with wound/dressing covered   Order Comments: You must COVER your dressing or incision with saran wrap (or any other non-permeable covering) to allow the incision to remain dry while showering.  You may shower 2-3 days after surgery as long as the surgical wound stays dry. Continue to cover your dressing or incision for showering until your first office visit.  You are strictly prohibited from soaking   or submerging the surgical wound underwater.     Crutches DME   Order Comments: DME Documentation: Describe the reason for need to support medical necessity: Impaired gait status post knee surgery. I, the undersigned, certify that the above prescribed supplies are medically necessary for this patient and is both reasonable and necessary in reference to accepted standards of medical practice in the treatment of this patient's condition and is not prescribed as a convenience.     Order Specific Question Answer Comments   DME Provider: [employer]    Crutch Type: Standard    Crutches Add On: NA    Length of Need: Lifetime      Cane DME   Order Comments: DME Documentation: Describe the reason for need to support medical necessity: Impaired gait status post knee surgery. I, the undersigned, certify that the above prescribed supplies are medically necessary for this patient and is both reasonable and necessary in reference to accepted standards of medical practice in the treatment of this patient's condition and is not prescribed as a convenience.     Order Specific Question Answer  Comments   DME Provider: [facility]    Cane Type: Single Tip    Reminder: Patient can typically get 1 every 5 years      Walker DME   Order Comments: DME Documentation: Describe the reason for need to support medical necessity: Impaired gait status post knee surgery. I, the undersigned, certify that the above prescribed supplies are medically necessary for this patient and is both reasonable and necessary in reference to accepted standards of medical practice in the treatment of this patient's condition and is not prescribed as a convenience.     Order Specific Question Answer Comments   DME Provider: [facility]    Walker Type: Standard (2 Wheel)    Accessories: N/A      Discharge Instruction - Regular Diet Adult   Order Comments: Return to your pre-surgery diet unless instructed otherwise     Order Specific Question Answer Comments   Is discharge order? Yes      Assign Questionnaire Series to Patient     Future Appointments   Date Time Provider Department Center   2/4/2022  7:30 AM Korey Yeh PT [facility] [place]   2/4/2022  9:30 AM Ariadna Elliott OT [facility] [place]   2/4/2022  3:00 PM Korey Yeh PT [facility] [place]   2/16/2022 11:45 AM Ernesto Avelar MD [facility]     John Robbins MD PGY-1   Orthopaedic Surgery Resident

## 2022-02-04 NOTE — PROGRESS NOTES
[facility]    Medicine Progress Note - Hospitalist Service, GOLD TEAM 16    Date of Admission:  2/3/2022    Assessment & Plan          Shannan Riojas is a 72 year old female admitted on 2/3/2022. She has history hypertension , essential tremor, hypothyroidism .      Osteoarthritis  -Status post  Right knee replacement, per orthopedic surgery    - had  right foot drop postop, now moving foot much better, able to dorsiflex  And plantar flex        Hypertension   - continue  metoprolol  And  Amlodipine. held her lisinopril and continue to hold till she sees her PMD and has repeat BMP .     Elevated CO2 over night  -no history MELIA, she will need out patient  Seep study. She was  on room air in AM and was doing well,  She the was dosing off and oxygen saturation  went down and got O2 back on and CO2 was up, ABG does show  Mild respiratory  Acidosis, palcing back on  BIPAP,  CXR nothing acute , avoid/ minimize  narcotics        CKD  - creatinine 1.57 this AM, was  1.38 1/31 , hold lisinopril, avoid NSAIDS, follow up  With PMD, discussed with  Patient          hypothyroidism  -continue medications     T2DM  -diet controlled , accu-checks insulin sliding scale for now      Depression  -continue ,medications            Diet: Advance Diet as Tolerated: Regular Diet Adult  Discharge Instruction - Regular Diet Adult    DVT Prophylaxis: per orthopedic surgery    Rockwell Catheter: Not present  Central Lines: None  Cardiac Monitoring: None  Code Status: Full Code      Disposition Plan  per orthopedic surgery ,  Hold discharge  For 2/4 with her CO2 retention              Stephanie Bazan MD  Hospitalist Service, GOLD TEAM 16  [facility]  Securely message with the Vocera Web Console (learn more here)  Text page via [employer] Paging/Directory   Please see signed in provider for up to date coverage information      Clinically Significant Risk  "Factors Present on Admission                 # Obesity: Estimated body mass index is 38.45 kg/m  as calculated from the following:    Height as of this encounter: 1.651 m (5' 5\").    Weight as of this encounter: 104.8 kg (231 lb 0.7 oz).      ______________________________________________________________________    Interval History      doing ok , pain ok, had BIPAP in over night for CO2 , now on room air and doing well, no chest pain  No shortness of breath        Data reviewed today: I reviewed all medications, new labs and imaging results over the last 24 hours. .    Physical Exam   Vital Signs: Temp: 98.3  F (36.8  C) Temp src: Oral BP: 128/61 Pulse: 73   Resp: 16 SpO2: 92 % O2 Device: None (Room air) Oxygen Delivery: 3 LPM  Weight: 231 lbs .67 oz     General appearence: awake alert   no apparent distress    HEENT: EOMI, PEARLA, sclera nonicteric, moist,mucus membranes,   NECK : supple  RESPIRATORY: lungs clear to auscultation bilateral,  no wheezing or crackles   CARDIOVASCULAR:S1 S2 regular rate and rhythm, no rubs gallops or murmurs appreciated  GASTROINTESTINAL:soft, non-distended , non-tender , + bowel sounds, no masses felt   SKIN: warm and dry, no mottling noted   NEUROLOGIC; awake alert and oriented, no focal deficits found  EXTREMITIES: no clubbing, cyanosis or edema , moves all extremity, good pedal pulses   MUSCULOSKELETAL: status post  Right knee surgery    Data   Recent Labs   Lab 02/04/22  0827 02/04/22  0606 02/03/22  2243 02/03/22  0934 02/03/22  0644   HGB  --  11.3*  --   --   --    NA  --  136  --   --   --    POTASSIUM  --  4.8  --   --  4.8   CHLORIDE  --  107  --   --   --    CO2  --  25  --   --   --    BUN  --  35*  --   --   --    CR  --  1.57*  --   --   --    ANIONGAP  --  4  --   --   --    ELIU  --  8.7  --   --   --    * 149* 126*   < >  --     < > = values in this interval not displayed.     Recent Results (from the past 24 hour(s))   XR Knee Port Right 1/2 Views    Narrative "    EXAM: XR KNEE PORT RIGHT 1/2 VIEWS  2/3/2022 10:23 AM      HISTORY: S/P R total Knee replacement    COMPARISON: 10/27/2021    FINDINGS: Portable postoperative AP and crosstable lateral views of  the right knee.    New total right knee arthroplasty. Components appear well seated.  Patellar enthesopathy. Postsurgical subcutaneous gas.       Impression    IMPRESSION: New total right knee arthroplasty.         CHAPIS DHILLON MD (Joe)         SYSTEM ID:  [id number]

## 2022-02-04 NOTE — PLAN OF CARE
[facility]      OUTPATIENT OCCUPATIONAL THERAPY  EVALUATION  PLAN OF TREATMENT FOR OUTPATIENT REHABILITATION  (COMPLETE FOR INITIAL CLAIMS ONLY)  Patient's Last Name, First Name, M.I.  YOB: 1950  BeulahShannan plummer  MAXI                          Provider's Name  [facility] Medical Record No.  [id number]                               Onset Date:  02/03/22   Start of Care Date:  02/04/22     Type:     ___PT   _X_OT   ___SLP Medical Diagnosis:  s/p R TKA                        OT Diagnosis:  Decreased functional mobility and ADLs    Visits from SOC:  1   _________________________________________________________________________________  Plan of Treatment/Functional Goals    Planned Interventions: ADL retraining,transfer training   Goals: See Occupational Therapy Goals on Care Plan in [facility] electronic health record.    Therapy Frequency: Daily  Predicted Duration of Therapy Intervention: 2-3 days  _________________________________________________________________________________    I CERTIFY THE NEED FOR THESE SERVICES FURNISHED UNDER        THIS PLAN OF TREATMENT AND WHILE UNDER MY CARE     (Physician co-signature of this document indicates review and certification of the therapy plan).              Certification date from: 02/04/22, Certification date to: 02/07/22    Referring Physician: Ernesto Avelar MD            Initial Assessment        See Occupational Therapy evaluation dated 02/04/22 in Epic electronic health record.

## 2022-02-04 NOTE — PROGRESS NOTES
Patient CO2 has been high in 50-60s, ipi 3-6 most of the evening, per observation patient has been holding breath and when breathing makes noises and snores, pt is easily aroused, A/O. Called DR. FRAZIER AND WILL APPLY CPAP. WILL MONITOR.

## 2022-02-04 NOTE — PLAN OF CARE
Physical Therapy Discharge Summary    Reason for therapy discharge:    Discharged to home with outpatient therapy.    Progress towards therapy goal(s). See goals on Care Plan [facility] health record for goal details.  Goals met    Therapy recommendation(s):    Continued therapy is recommended.  Rationale/Recommendations:  to progress independence .

## 2022-02-04 NOTE — PLAN OF CARE
Patient vital signs are at baseline: No,  Reason:  Patient was placed on BIPAP because of high CO2 and low IPI from midnight to this morning. See the note.    Patient able to ambulate as they were prior to admission or with assist devices provided by therapies during their stay:  Yes  Patient MUST void prior to discharge:  Yes  Patient able to tolerate oral intake:  Yes  Pain has adequate pain control using Oral analgesics:  No,  Reason:  was given IV dilaudid because of pain which was making her body shake.        VS: Temp: 96.8  F (36  C) Temp src: Axillary BP: 125/55 Pulse: 59   Resp: 8 (BPAP) SpO2: 96 % O2 Device: BiPAP/CPAP    O2: BIPAP now    Output: Voids commode   Last BM: 2/2/22   Activity: With walker, gait belt, 1 person   Up for meals? no   Skin: Right knee incision   Pain: Right knee, dilaudid, oxycodon   CMS: WNL   Dressing: RIGHT KNEE D/I, ice in place   Diet: Reg   LDA: Left Hand   Equipment: BIPAP, IV PUMP, POLE, CAPNO, Commode, chair   Plan: Patient is still sleeping will monitor need for BIPAP vitals has been stable.   Additional Info:

## 2022-02-04 NOTE — PROGRESS NOTES
"   02/04/22 0900   Quick Adds   Quick Adds Certification   Type of Visit Initial Occupational Therapy Evaluation   Living Environment   People in home alone   Current Living Arrangements house   Home Accessibility stairs to enter home;stairs within home   Transportation Anticipated family or friend will provide   Living Environment Comments Pt typically lives alone but will be staying with her daughter initally (see above info on stairs). Daughter works from home. Pt's daughter's house has walk-in shower & has riser on toilet. To enter pt's home, there is 5+3 steps with railings, then all needs met 1 level except 4 stairs down to let dogs out in yard. Pt unsure how long she will stay with her daughter, \"it depends on how I'm feeling.\" pt's bathroom has tub shower with grab bars, riser on toilet.    Self-Care   Current Activity Tolerance fair   Equipment Currently Used at Home   (has reachers, grab bar in tub, raised toilet, shower chair )   Activity/Exercise/Self-Care Comment Pt is independent with all mobility at baseline, has a cane that she sometimes uses but not often. Pt does have walker available for home use (borrowed from a friend).    Instrumental Activities of Daily Living (IADL)   IADL Comments will have assist with IADLs    Disability/Function   Hearing Difficulty or Deaf no   Wear Glasses or Blind yes   Vision Management glasses   Concentrating, Remembering or Making Decisions Difficulty no   Difficulty Communicating no   Difficulty Eating/Swallowing no   Walking or Climbing Stairs Difficulty yes   Walking or Climbing Stairs ambulation difficulty, requires equipment   Fall history within last six months yes   Number of times patient has fallen within last six months 3   General Information   Onset of Illness/Injury or Date of Surgery 02/03/22   Referring Physician Ernesto Avelar MD   Patient/Family Therapy Goal Statement (OT) Did not endorse   Additional Occupational Profile Info/Pertinent History " of Current Problem s/p R TKA   Existing Precautions/Restrictions fall;other (see comments)  (no pillow resting under R knee)   Right Lower Extremity (Weight-bearing Status) weight-bearing as tolerated (WBAT)   Cognitive Status Examination   Orientation Status orientation to person, place and time   Affect/Mental Status (Cognitive) WNL   Follows Commands WNL   Visual Perception   Visual Impairment/Limitations corrective lenses full-time   Sensory   Sensory Quick Adds No deficits were identified   Pain Assessment   Patient Currently in Pain Yes, see Vital Sign flowsheet   Range of Motion Comprehensive   General Range of Motion no range of motion deficits identified   Strength Comprehensive (MMT)   Comment, General Manual Muscle Testing (MMT) Assessment B UE's WFL    Coordination   Upper Extremity Coordination No deficits were identified   Bed Mobility   Comment (Bed Mobility) Unable to complete this am due to pain, needed max A to reposition R LE. Reports pain 10/10..   Transfers   Transfer Comments Unable to complete this am due to pain, pt in tears. RN aware. Per pt and chart, she was able to transfer using FWW with SBA this am and was able to complete toilet transfer SBA.    Lower Body Dressing Assessment   [place] Level (Lower Body Dressing) maximum assist (25% patient effort)   Toileting   [place] Level (Toileting) supervision;set up  (per pt, prior to pain increase)   Clinical Impression   Criteria for Skilled Therapeutic Interventions Met (OT) yes   OT Diagnosis Decreased functional mobility and ADLs    OT Problem List-Impairments impacting ADL range of motion (ROM);pain;strength   Assessment of Occupational Performance 3-5 Performance Deficits   Identified Performance Deficits dressing,bathing, toileting, transfers, home mgmt   Planned Therapy Interventions (OT) ADL retraining;transfer training   Clinical Decision Making Complexity (OT) low complexity   Therapy Frequency (OT) Daily   Predicted  Duration of Therapy 2-3 days   Anticipated Equipment Needs Upon Discharge (OT)   (TBD)   Risk & Benefits of therapy have been explained care plan/treatment goals reviewed;patient   OT Discharge Planning    OT Discharge Recommendation (DC Rec) Home with assist   OT Rationale for DC Rec Anticipate once pain is better controlled, pt will be able to discharge to [facility] with assist.    OT Brief overview of current status  Initiated eval and attempted back 3 times after pain meds for OOB activity, patient crying in pain. Unable to tolerate any OOB activity.   Therapy Certification   Start of Care Date 02/04/22   Certification date from 02/04/22   Certification date to 02/07/22   Medical Diagnosis s/p R TKA   Total Evaluation Time (Minutes)   Total Evaluation Time (Minutes) 8

## 2022-02-04 NOTE — PLAN OF CARE
"  VS: Blood pressure 128/61, pulse 73, temperature 98.3  F (36.8  C), temperature source Oral, resp. rate 16, height 1.651 m (5' 5\"), weight 104.8 kg (231 lb 0.7 oz), SpO2 92 %.  Denies SOB, CP   O2: Encourage IS. LS CTA, diminished   Pt mild respiratory acidosis today. Needing bipap while resting. Place on CAPNO and O2 as needed during bipap breaks. Using 2 LPM via NC and 30% fiO2 on bipap.   Output: Voids in BR   Last BM: 2/2. Declined miralax   Activity: Up with Assist x1, WW, GB. Walked in halls with PT   Skin: Intact ex incision   Pain: C/o R knee pain, oxycodone, tzanidine and tylenol given  Try to avoid narcotic use with respiratory acidosis per MD  Heat packs to back for pain r/t scoliosis per pt   CMS: Intact. AxO. Tingling to RLE fading per pt   Dressing: CDI. Ice applied   Diet: Regular  , 124   LDA: L PIV SL   Equipment: WW. Capno. Bipap   Plan: Discharge home TBD   Additional Info:       "

## 2022-02-05 ENCOUNTER — APPOINTMENT (OUTPATIENT)
Dept: OCCUPATIONAL THERAPY | Facility: CLINIC | Age: 72
End: 2022-02-05
Attending: STUDENT IN AN ORGANIZED HEALTH CARE EDUCATION/TRAINING PROGRAM
Payer: COMMERCIAL

## 2022-02-05 LAB
ANION GAP SERPL CALCULATED.3IONS-SCNC: 7 MMOL/L (ref 3–14)
BASE EXCESS BLDA CALC-SCNC: -0.5 MMOL/L (ref -9–1.8)
BASE EXCESS BLDV CALC-SCNC: -0.5 MMOL/L (ref -7.7–1.9)
BASOPHILS # BLD AUTO: 0 10E3/UL (ref 0–0.2)
BASOPHILS NFR BLD AUTO: 0 %
BUN SERPL-MCNC: 37 MG/DL (ref 7–30)
CALCIUM SERPL-MCNC: 8.9 MG/DL (ref 8.5–10.1)
CHLORIDE BLD-SCNC: 108 MMOL/L (ref 94–109)
CO2 SERPL-SCNC: 24 MMOL/L (ref 20–32)
CREAT SERPL-MCNC: 1.6 MG/DL (ref 0.52–1.04)
EOSINOPHIL # BLD AUTO: 0.3 10E3/UL (ref 0–0.7)
EOSINOPHIL NFR BLD AUTO: 2 %
ERYTHROCYTE [DISTWIDTH] IN BLOOD BY AUTOMATED COUNT: 14 % (ref 10–15)
GFR SERPL CREATININE-BSD FRML MDRD: 34 ML/MIN/1.73M2
GLUCOSE BLD-MCNC: 120 MG/DL (ref 70–99)
GLUCOSE BLDC GLUCOMTR-MCNC: 126 MG/DL (ref 70–99)
GLUCOSE BLDC GLUCOMTR-MCNC: 131 MG/DL (ref 70–99)
GLUCOSE BLDC GLUCOMTR-MCNC: 215 MG/DL (ref 70–99)
HCO3 BLD-SCNC: 25 MMOL/L (ref 21–28)
HCO3 BLDV-SCNC: 27 MMOL/L (ref 21–28)
HCT VFR BLD AUTO: 33.9 % (ref 35–47)
HGB BLD-MCNC: 10.5 G/DL (ref 11.7–15.7)
HOLD SPECIMEN: NORMAL
IMM GRANULOCYTES # BLD: 0 10E3/UL
IMM GRANULOCYTES NFR BLD: 0 %
LACTATE SERPL-SCNC: 0.6 MMOL/L (ref 0.7–2)
LYMPHOCYTES # BLD AUTO: 1.1 10E3/UL (ref 0.8–5.3)
LYMPHOCYTES NFR BLD AUTO: 10 %
MCH RBC QN AUTO: 28.9 PG (ref 26.5–33)
MCHC RBC AUTO-ENTMCNC: 31 G/DL (ref 31.5–36.5)
MCV RBC AUTO: 95 FL (ref 78–100)
MONOCYTES # BLD AUTO: 1.8 10E3/UL (ref 0–1.3)
MONOCYTES NFR BLD AUTO: 16 %
NEUTROPHILS # BLD AUTO: 7.8 10E3/UL (ref 1.6–8.3)
NEUTROPHILS NFR BLD AUTO: 72 %
NRBC # BLD AUTO: 0 10E3/UL
NRBC BLD AUTO-RTO: 0 /100
O2/TOTAL GAS SETTING VFR VENT: 0 %
O2/TOTAL GAS SETTING VFR VENT: 21 %
PCO2 BLD: 44 MM HG (ref 35–45)
PCO2 BLDV: 56 MM HG (ref 40–50)
PH BLD: 7.36 [PH] (ref 7.35–7.45)
PH BLDV: 7.29 [PH] (ref 7.32–7.43)
PLATELET # BLD AUTO: 234 10E3/UL (ref 150–450)
PO2 BLD: 94 MM HG (ref 80–105)
PO2 BLDV: 32 MM HG (ref 25–47)
POTASSIUM BLD-SCNC: 4 MMOL/L (ref 3.4–5.3)
RBC # BLD AUTO: 3.56 10E6/UL (ref 3.8–5.2)
SODIUM SERPL-SCNC: 139 MMOL/L (ref 133–144)
WBC # BLD AUTO: 11 10E3/UL (ref 4–11)

## 2022-02-05 PROCEDURE — 82803 BLOOD GASES ANY COMBINATION: CPT | Performed by: STUDENT IN AN ORGANIZED HEALTH CARE EDUCATION/TRAINING PROGRAM

## 2022-02-05 PROCEDURE — 97530 THERAPEUTIC ACTIVITIES: CPT | Mod: GO

## 2022-02-05 PROCEDURE — 250N000013 HC RX MED GY IP 250 OP 250 PS 637: Performed by: CLINICAL NURSE SPECIALIST

## 2022-02-05 PROCEDURE — 36415 COLL VENOUS BLD VENIPUNCTURE: CPT | Performed by: STUDENT IN AN ORGANIZED HEALTH CARE EDUCATION/TRAINING PROGRAM

## 2022-02-05 PROCEDURE — 999N000157 HC STATISTIC RCP TIME EA 10 MIN

## 2022-02-05 PROCEDURE — 36600 WITHDRAWAL OF ARTERIAL BLOOD: CPT

## 2022-02-05 PROCEDURE — 83605 ASSAY OF LACTIC ACID: CPT | Performed by: INTERNAL MEDICINE

## 2022-02-05 PROCEDURE — 250N000013 HC RX MED GY IP 250 OP 250 PS 637: Performed by: STUDENT IN AN ORGANIZED HEALTH CARE EDUCATION/TRAINING PROGRAM

## 2022-02-05 PROCEDURE — 97535 SELF CARE MNGMENT TRAINING: CPT | Mod: GO

## 2022-02-05 PROCEDURE — 99207 PR CDG-CODE CATEGORY CHANGED: CPT | Performed by: INTERNAL MEDICINE

## 2022-02-05 PROCEDURE — 82803 BLOOD GASES ANY COMBINATION: CPT | Performed by: INTERNAL MEDICINE

## 2022-02-05 PROCEDURE — 250N000013 HC RX MED GY IP 250 OP 250 PS 637: Performed by: INTERNAL MEDICINE

## 2022-02-05 PROCEDURE — 80048 BASIC METABOLIC PNL TOTAL CA: CPT | Performed by: INTERNAL MEDICINE

## 2022-02-05 PROCEDURE — 99215 OFFICE O/P EST HI 40 MIN: CPT | Performed by: INTERNAL MEDICINE

## 2022-02-05 PROCEDURE — 258N000003 HC RX IP 258 OP 636: Performed by: INTERNAL MEDICINE

## 2022-02-05 PROCEDURE — 36415 COLL VENOUS BLD VENIPUNCTURE: CPT | Performed by: INTERNAL MEDICINE

## 2022-02-05 PROCEDURE — 85018 HEMOGLOBIN: CPT | Performed by: STUDENT IN AN ORGANIZED HEALTH CARE EDUCATION/TRAINING PROGRAM

## 2022-02-05 PROCEDURE — 82962 GLUCOSE BLOOD TEST: CPT

## 2022-02-05 RX ADMIN — SENNOSIDES AND DOCUSATE SODIUM 1 TABLET: 50; 8.6 TABLET ORAL at 20:00

## 2022-02-05 RX ADMIN — OXYCODONE HYDROCHLORIDE 10 MG: 10 TABLET ORAL at 07:39

## 2022-02-05 RX ADMIN — SERTRALINE HYDROCHLORIDE 200 MG: 100 TABLET ORAL at 07:42

## 2022-02-05 RX ADMIN — OXYCODONE HYDROCHLORIDE 5 MG: 5 TABLET ORAL at 20:09

## 2022-02-05 RX ADMIN — GABAPENTIN 100 MG: 100 CAPSULE ORAL at 21:50

## 2022-02-05 RX ADMIN — OXYCODONE HYDROCHLORIDE 10 MG: 10 TABLET ORAL at 03:10

## 2022-02-05 RX ADMIN — ACETAMINOPHEN 975 MG: 325 TABLET, FILM COATED ORAL at 21:50

## 2022-02-05 RX ADMIN — OXYCODONE HYDROCHLORIDE 5 MG: 5 TABLET ORAL at 15:01

## 2022-02-05 RX ADMIN — LEVOTHYROXINE SODIUM 125 MCG: 25 TABLET ORAL at 07:41

## 2022-02-05 RX ADMIN — ASPIRIN 81 MG: 81 TABLET, COATED ORAL at 20:00

## 2022-02-05 RX ADMIN — ACETAMINOPHEN 975 MG: 325 TABLET, FILM COATED ORAL at 06:02

## 2022-02-05 RX ADMIN — ACETAMINOPHEN 975 MG: 325 TABLET, FILM COATED ORAL at 15:01

## 2022-02-05 RX ADMIN — TIZANIDINE 4 MG: 4 TABLET ORAL at 18:08

## 2022-02-05 RX ADMIN — TIZANIDINE 4 MG: 4 TABLET ORAL at 04:34

## 2022-02-05 RX ADMIN — TIZANIDINE 4 MG: 4 TABLET ORAL at 11:55

## 2022-02-05 RX ADMIN — ASPIRIN 81 MG: 81 TABLET, COATED ORAL at 07:42

## 2022-02-05 RX ADMIN — ATORVASTATIN CALCIUM 40 MG: 40 TABLET, FILM COATED ORAL at 07:41

## 2022-02-05 RX ADMIN — METOPROLOL SUCCINATE 100 MG: 25 TABLET, EXTENDED RELEASE ORAL at 07:40

## 2022-02-05 RX ADMIN — SENNOSIDES AND DOCUSATE SODIUM 1 TABLET: 50; 8.6 TABLET ORAL at 07:41

## 2022-02-05 RX ADMIN — SODIUM CHLORIDE 500 ML: 9 INJECTION, SOLUTION INTRAVENOUS at 14:51

## 2022-02-05 NOTE — PLAN OF CARE
"  VS: Blood pressure 139/69, pulse 72, temperature 98.9  F (37.2  C), temperature source Oral, resp. rate 16, height 1.651 m (5' 5\"), weight 104.8 kg (231 lb 0.7 oz), SpO2 94 %.  Denies SOB, CP   O2: Encourage IS. LS CTA, diminished   Pt has mild respiratory acidosis. Encourage bipap while resting. Pt does not tolerate well d/t beeping. Place on CAPNO during bipap breaks.  Desaturating to 87% on RA. 1.5 LPM via NC to maintain >90%   Output: Voids in BR   Last BM: 2/2. Declined miralax   Activity: Up with Assist x1, WW, GB. PT cleared pt. Encourage activity, walked 100 feet in halls   Skin: Intact ex incision. ACE rewrapped   Pain: C/o R knee pain, oxycodone, tzanidine and tylenol given  Try to avoid narcotic use with respiratory acidosis per MD  Heat packs to back for pain r/t scoliosis per pt   CMS: Intact. AxO. N/T for RLE since surgery, increases with times of pain   Dressing: CDI. Ice applied   Diet: Regular     LDA: L PIV SL   Equipment: WW. Capno. Bipap   Plan: Discharge home TBD pending medical clearance   Additional Info: Updated daughter Dorene via phone, pt will discharge to daughters home.   Pt will have sleep oximetry study done overnight  Fluid bolus given for elevated BMP levels      "

## 2022-02-05 NOTE — PROGRESS NOTES
[facility]    Medicine Progress Note - Hospitalist Service, GOLD TEAM 16    Date of Admission:  2/3/2022    Assessment & Plan          Shannan Riojas is a 72 year old female admitted on 2/3/2022. She has history hypertension , essential tremor, hypothyroidism .      Osteoarthritis  -Status post  Right knee replacement, per orthopedic surgery    - had  right foot drop postop, now moving foot much better, able to dorsiflex  And plantar flex        Hypertension   - continue  metoprolol  And  Amlodipine. held her lisinopril and continue to hold till she sees her PMD and has repeat BMP .     Elevated CO2   -no history MELIA, she will need out patient  Seep study. She was  on room air in AM 8/4  and was doing well however when she was  dosing off and oxygen saturation  went down and got O2 back on and CO2 was up, ABG does show  Mild respiratory  Acidosis. She does not like  BIPAP and did not tolerate it,  CXR nothing acute , avoid/ minimize  narcotics    Her ABG 2/4 pH 7.29 CO2  52  Bicarb 25, VBG 2/5 was   pH 7.29 with venous pCO2 56 . No evidence of lactic acidosis ( 0.6) . Will check over night oxymetry   -discussed aggressive IS use     CKD  - creatinine 1.5 --- 1.6  this AM, was  1.38 1/31 , hold lisinopril, avoid NSAIDS,   - will give gentle IV fluids    -follow up  With PMD, discussed with  Patient          hypothyroidism  -continue medications     T2DM  -diet controlled , accu-checks insulin sliding scale for now  blood sugar 120-180       Depression  -continue ,medications            Diet: Advance Diet as Tolerated: Regular Diet Adult  Discharge Instruction - Regular Diet Adult    DVT Prophylaxis: per orthopedic surgery    Rockwell Catheter: Not present  Central Lines: None  Cardiac Monitoring: None  Code Status: Full Code      Disposition Plan  per orthopedic surgery ,  Hold discharge  For 2/4 with her CO2 retention              Stephanie Bazan MD  Hospitalist Service,  "GOLD TEAM 16  [facility]  Securely message with the [employer] Web Console (learn more here)  Text page via [facility] Paging/Directory   Please see signed in provider for up to date coverage information      Clinically Significant Risk Factors Present on Admission                 # Obesity: Estimated body mass index is 38.45 kg/m  as calculated from the following:    Height as of this encounter: 1.651 m (5' 5\").    Weight as of this encounter: 104.8 kg (231 lb 0.7 oz).      ______________________________________________________________________    Interval History        Feels ok, does not like BIPAP so did not have it on over night , denies ant chest pain  No shortness of breath , surgical pain better today          Data reviewed today: I reviewed all medications, new labs and imaging results over the last 24 hours. .    Physical Exam   Vital Signs: Temp: 98.9  F (37.2  C) Temp src: Oral BP: 139/69 Pulse: 72   Resp: 16 SpO2: 94 % O2 Device: None (Room air) Oxygen Delivery: 2 LPM  Weight: 231 lbs .67 oz     General appearence: awake alert   no apparent distress    HEENT: EOMI, PEARLA, sclera nonicteric, moist,mucus membranes,   NECK : supple  RESPIRATORY: lungs clear to auscultation bilateral,  no wheezing or crackles   CARDIOVASCULAR:S1 S2 regular rate and rhythm, no rubs gallops or murmurs appreciated  GASTROINTESTINAL:soft, non-distended , non-tender , + bowel sounds, no masses felt   SKIN: warm and dry, no mottling noted   NEUROLOGIC; awake alert and oriented, no focal deficits found  EXTREMITIES: no clubbing, cyanosis or edema , moves all extremity, good pedal pulses   MUSCULOSKELETAL: status post  Right knee surgery    Data   Recent Labs   Lab 02/05/22  0806 02/05/22  0553 02/04/22  2256 02/04/22  0827 02/04/22  0606 02/03/22  0934 02/03/22  0644   WBC  --  11.0  --   --   --   --   --    HGB  --  10.5*  --   --  11.3*  --   --    MCV  --  95  --   --   --   --   --    PLT  " --  234  --   --   --   --   --    NA  --  139  --   --  136  --   --    POTASSIUM  --  4.0  --   --  4.8  --  4.8   CHLORIDE  --  108  --   --  107  --   --    CO2  --  24  --   --  25  --   --    BUN  --  37*  --   --  35*  --   --    CR  --  1.60*  --   --  1.57*  --   --    ANIONGAP  --  7  --   --  4  --   --    ELIU  --  8.9  --   --  8.7  --   --    * 120* 185*   < > 149*   < >  --     < > = values in this interval not displayed.     No results found for this or any previous visit (from the past 24 hour(s)).

## 2022-02-05 NOTE — PLAN OF CARE
Patient A/Ox4, paul lady. VSS ex sats in the 80s while asleep. Denies CP, SOB, dizziness/LH. LSCTA. +fl/BS. Voiding well in bathroom. CMS intact ex some numbness periodically on R leg. Dressing to RLE CDI. Tolerating regular diet without NV. Activity level is fair, pt up to bathroom x1. IV SL. Pain rated as very sore throughout shift, especially with & after movement, pt taking oxycodone and zanaflex PRN. Pt did not want any alarms, did not wear Bipap or CAPNO. Possible discharge today. Patient has demonstrated ability to call appropriately. Patient is resting with call light within reach. Will continue to monitor.

## 2022-02-05 NOTE — UTILIZATION REVIEW
Admission Status; Secondary Review Determination for Procedure      Under the authority of the Utilization Management Committee, the utilization review process indicated a secondary review on the above patient. The review outcome is based on review of the medical records, discussions with staff, and applying clinical experience noted on the date of the review.     (x) Outpatient Status with extended recovery is appropriate - This patient does not meet hospital inpatient criteria. If this patient's primary payer is Medicare and was admitted as an inpatient, Condition Code 44 should be used and patient status changed to outpatient recovery.    RATIONALE FOR DETERMINATION   72 year old female who underwent an elective right total knee arthroplasty on 2/3/2022 at the [facility]. Patient was admitted to the hospital after the procedure. Patient has Medicare and the procedure is not on the CMS inpatient list. No documented complications or unexpected recovery. Patient can be safely  monitored for bleeding and recover in outpatient/extended recovery setting.   The severity of illness, intensity of service provided, expected LOS and risk for adverse outcome doesn't meet inpatient hospital admission.     This document was produced using voice recognition software.     The information on this document is developed by the utilization review team in order for the business office to ensure compliance. This only denotes the appropriateness of proper admission status and does not reflect the quality of care rendered.   The definitions of Inpatient Status and Observation Status used in making the determination above are those provided in the CMS Coverage Manual, Chapter 1 and Chapter 6, section 70.4.     Sincerely,   Glenis Gaines MD   Physican Advisor  Utilization Management   [facility].

## 2022-02-05 NOTE — PROGRESS NOTES
"  Orthopaedic Surgery Daily Progress Note     Subjective: Shannan Riojas is a 72 year old female POD # 2 s/p right TKA. Issues with pain control precluded her from making progress with PT. Tolerating an oral diet. No fever, chills. No chest pain or shortness of breath. No abdominal pain, nausea, vomiting. No diarrhea or constipation. No urinary difficulties.     Physical Exam   /57 (BP Location: Right arm)   Pulse 67   Temp 98.9  F (37.2  C) (Oral)   Resp 16   Ht 1.651 m (5' 5\")   Wt 104.8 kg (231 lb 0.7 oz)   SpO2 90%   BMI 38.45 kg/m      Intake/Output Summary (Last 24 hours) at 2/4/2022 0539  Last data filed at 2/3/2022 1510  Gross per 24 hour   Intake 850 ml   Output 300 ml   Net 550 ml     General: Alert, well-appearing in no acute distress.  Respiratory: Non-labored breathing.   Cardiovascular: Extremities warm and well perfused  Extremities: moving all four extremities.   RLE:  -Sens: SILT dp/sp/s/s/t  -Motor: 5/5 EHL/FHL/TA/GSc  -Vasc: 2+ pulses, wwp, brisk cap refill    Dressing CDI    Skin: As noted above. No rashes or lesions appreciated.    Labs    Complete Blood Count   Recent Labs   Lab 02/04/22  0606   HGB 11.3*     Basic Metabolic Panel  Recent Labs   Lab 02/04/22  2256 02/04/22  1905 02/04/22  1329 02/04/22  0827 02/04/22  0606 02/03/22  0934 02/03/22  0644   NA  --   --   --   --  136  --   --    POTASSIUM  --   --   --   --  4.8  --  4.8   CHLORIDE  --   --   --   --  107  --   --    CO2  --   --   --   --  25  --   --    BUN  --   --   --   --  35*  --   --    CR  --   --   --   --  1.57*  --   --    * 191* 124* 133* 149*   < >  --     < > = values in this interval not displayed.     Coagulation Profile  No lab results found in last 7 days.    Assessment/Plan:  Assessment and Plan:  Shannan Riojas is a 72 year old female s/p right TKA on 2/3 with Dr. Avelar     Discharge this afternoon if pain control improves      Orthopedic Surgery Primary  Activity: Up with assist " until independent. Knee ROM as tolerated.   Weight bearing status: WBAT.  Pain management: Transition from IV to PO as tolerated.    Antibiotics: Ancef x 24 hours  Diet: Begin with clear fluids and progress diet as tolerated.   DVT prophylaxis:  gm  Imaging: completed   Labs: Monitor Hgb   Bracing/Splinting: None.   Dressings: Keep clean, dry and intact   Elevation: Elevate RLE on pillows to keep above the level of the heart as much as possible - no pillows under knee.   Physical Therapy/Occupational Therapy: Eval and treat.  Consults: IM, SW.  Follow-up: Clinic 2 weeks  Disposition: likely discharge to home today    Rick Lam MD   Orthopaedic Surgery Resident, PGY-4  P: [phone]    Discussed with Dr. Avelar

## 2022-02-05 NOTE — PLAN OF CARE
Occupational Therapy Discharge Summary    Reason for therapy discharge:    All goals and outcomes met, no further needs identified.    Progress towards therapy goal(s). See goals on Care Plan in [facility] electronic health record for goal details.  Goals met    Therapy recommendation(s):    Home with assist from daughter

## 2022-02-06 VITALS
TEMPERATURE: 98.3 F | SYSTOLIC BLOOD PRESSURE: 155 MMHG | BODY MASS INDEX: 38.49 KG/M2 | HEART RATE: 83 BPM | WEIGHT: 231.04 LBS | OXYGEN SATURATION: 92 % | DIASTOLIC BLOOD PRESSURE: 70 MMHG | HEIGHT: 65 IN | RESPIRATION RATE: 15 BRPM

## 2022-02-06 LAB
ANION GAP SERPL CALCULATED.3IONS-SCNC: 8 MMOL/L (ref 3–14)
BASOPHILS # BLD AUTO: 0 10E3/UL (ref 0–0.2)
BASOPHILS NFR BLD AUTO: 0 %
BUN SERPL-MCNC: 32 MG/DL (ref 7–30)
CALCIUM SERPL-MCNC: 8.7 MG/DL (ref 8.5–10.1)
CHLORIDE BLD-SCNC: 108 MMOL/L (ref 94–109)
CO2 SERPL-SCNC: 23 MMOL/L (ref 20–32)
CREAT SERPL-MCNC: 1.32 MG/DL (ref 0.52–1.04)
EOSINOPHIL # BLD AUTO: 0.4 10E3/UL (ref 0–0.7)
EOSINOPHIL NFR BLD AUTO: 4 %
ERYTHROCYTE [DISTWIDTH] IN BLOOD BY AUTOMATED COUNT: 13.8 % (ref 10–15)
GFR SERPL CREATININE-BSD FRML MDRD: 43 ML/MIN/1.73M2
GLUCOSE BLD-MCNC: 139 MG/DL (ref 70–99)
GLUCOSE BLDC GLUCOMTR-MCNC: 102 MG/DL (ref 70–99)
GLUCOSE BLDC GLUCOMTR-MCNC: 123 MG/DL (ref 70–99)
HCT VFR BLD AUTO: 32.2 % (ref 35–47)
HGB BLD-MCNC: 10.2 G/DL (ref 11.7–15.7)
IMM GRANULOCYTES # BLD: 0 10E3/UL
IMM GRANULOCYTES NFR BLD: 0 %
LYMPHOCYTES # BLD AUTO: 1.1 10E3/UL (ref 0.8–5.3)
LYMPHOCYTES NFR BLD AUTO: 10 %
MCH RBC QN AUTO: 29 PG (ref 26.5–33)
MCHC RBC AUTO-ENTMCNC: 31.7 G/DL (ref 31.5–36.5)
MCV RBC AUTO: 92 FL (ref 78–100)
MONOCYTES # BLD AUTO: 1.3 10E3/UL (ref 0–1.3)
MONOCYTES NFR BLD AUTO: 12 %
NEUTROPHILS # BLD AUTO: 8.2 10E3/UL (ref 1.6–8.3)
NEUTROPHILS NFR BLD AUTO: 74 %
NRBC # BLD AUTO: 0 10E3/UL
NRBC BLD AUTO-RTO: 0 /100
PLATELET # BLD AUTO: 239 10E3/UL (ref 150–450)
POTASSIUM BLD-SCNC: 4.2 MMOL/L (ref 3.4–5.3)
RBC # BLD AUTO: 3.52 10E6/UL (ref 3.8–5.2)
SODIUM SERPL-SCNC: 139 MMOL/L (ref 133–144)
WBC # BLD AUTO: 11.1 10E3/UL (ref 4–11)

## 2022-02-06 PROCEDURE — 82962 GLUCOSE BLOOD TEST: CPT

## 2022-02-06 PROCEDURE — 36415 COLL VENOUS BLD VENIPUNCTURE: CPT | Performed by: INTERNAL MEDICINE

## 2022-02-06 PROCEDURE — 80048 BASIC METABOLIC PNL TOTAL CA: CPT | Performed by: INTERNAL MEDICINE

## 2022-02-06 PROCEDURE — 250N000013 HC RX MED GY IP 250 OP 250 PS 637: Performed by: CLINICAL NURSE SPECIALIST

## 2022-02-06 PROCEDURE — 99207 PR CDG-CODE CATEGORY CHANGED: CPT | Performed by: INTERNAL MEDICINE

## 2022-02-06 PROCEDURE — 99215 OFFICE O/P EST HI 40 MIN: CPT | Performed by: INTERNAL MEDICINE

## 2022-02-06 PROCEDURE — 85014 HEMATOCRIT: CPT | Performed by: INTERNAL MEDICINE

## 2022-02-06 PROCEDURE — 250N000013 HC RX MED GY IP 250 OP 250 PS 637: Performed by: STUDENT IN AN ORGANIZED HEALTH CARE EDUCATION/TRAINING PROGRAM

## 2022-02-06 PROCEDURE — 250N000013 HC RX MED GY IP 250 OP 250 PS 637: Performed by: INTERNAL MEDICINE

## 2022-02-06 RX ORDER — LISINOPRIL 40 MG/1
40 TABLET ORAL DAILY
Refills: 0
Start: 2022-02-06

## 2022-02-06 RX ADMIN — ACETAMINOPHEN 975 MG: 325 TABLET, FILM COATED ORAL at 05:33

## 2022-02-06 RX ADMIN — ATORVASTATIN CALCIUM 40 MG: 40 TABLET, FILM COATED ORAL at 07:50

## 2022-02-06 RX ADMIN — ACETAMINOPHEN 650 MG: 325 TABLET, FILM COATED ORAL at 09:37

## 2022-02-06 RX ADMIN — LEVOTHYROXINE SODIUM 125 MCG: 25 TABLET ORAL at 07:50

## 2022-02-06 RX ADMIN — TIZANIDINE 4 MG: 4 TABLET ORAL at 07:50

## 2022-02-06 RX ADMIN — SERTRALINE HYDROCHLORIDE 200 MG: 100 TABLET ORAL at 07:50

## 2022-02-06 RX ADMIN — OXYCODONE HYDROCHLORIDE 5 MG: 5 TABLET ORAL at 09:37

## 2022-02-06 RX ADMIN — ASPIRIN 81 MG: 81 TABLET, COATED ORAL at 07:50

## 2022-02-06 RX ADMIN — TIZANIDINE 4 MG: 4 TABLET ORAL at 01:05

## 2022-02-06 RX ADMIN — OXYCODONE HYDROCHLORIDE 5 MG: 5 TABLET ORAL at 05:39

## 2022-02-06 RX ADMIN — SENNOSIDES AND DOCUSATE SODIUM 1 TABLET: 50; 8.6 TABLET ORAL at 07:50

## 2022-02-06 RX ADMIN — METOPROLOL SUCCINATE 100 MG: 25 TABLET, EXTENDED RELEASE ORAL at 07:50

## 2022-02-06 NOTE — PLAN OF CARE
"  VS: /53 (BP Location: Left arm)   Pulse 63   Temp 98  F (36.7  C) (Oral)   Resp 20   Ht 1.651 m (5' 5\")   Wt 104.8 kg (231 lb 0.7 oz)   SpO2 96%   BMI 38.45 kg/m     O2: SpO2 > 90% on RA. LS clear and equal bilaterally. Denies SOB and chest pain. Sleep oximetry study done over night.    Output: Voids spontaneously without difficulty to bathroom x2   Last BM: 2/2/22. BS active in all quadrants   Activity: Up with A1 using walker and gait belt.   Skin: WDL except surgical incision to RLE   Pain: Pain managed with PRN Oxycodone, Zanaflex and Tylenol. Oxycodone 5mg given x1. Cold applied. Per MD - avoid/ minimize narcotics due to respiratory acidosis.     CMS: Intact. Denies numbness and tingling. AOX4.    Dressing: CDI   Diet: Regular diet, denies n/v   and 102   LDA: L PIV SL.   Equipment: CAPNO, BIPAP, personal belongings, walker, gait belt, PCDs   Plan: TBD. Possible discharge if medically cleared. Continue with plan of care, Call light in pt's reach.   Additional Info:       "

## 2022-02-06 NOTE — PLAN OF CARE
"VS: Blood pressure (!) 155/70, pulse 83, temperature 98.3  F (36.8  C), temperature source Oral, resp. rate 15, height 1.651 m (5' 5\"), weight 104.8 kg (231 lb 0.7 oz), SpO2 92 %.  Denies SOB, CP   O2: Encourage IS. LS CTA, diminished   RA maintaining >90%. Sleep study done overnight   Output: Voids in BR   Last BM: 2/2. Declined miralax. Took senna. BS+ FL+   Activity: Up with Assist x1, WW, GB   Skin: Intact ex incision   Pain: C/o R knee pain, oxycodone, tzanidine and tylenol given   CMS: Intact. AxO. N/T to RLE since surgery, increases with times of pain   Dressing: CDI. Ice applied   Diet: Regular, diet controlled T2DM     LDA: L PIV SL   Equipment: BENNIE. [place]. Bipap. IS. PCDs   Plan: Discharge home TBD pending medical clearance   Additional Info:         "

## 2022-02-06 NOTE — PLAN OF CARE
DISCHARGE SUMMARY    Pt discharging to: daughter's home  Transportation: wheelchair to daughter   AVS given and discussed: yes  Stoplight Tool given and discussed: yes  Medications given: yes  Belongings returned: yes  Comments: tolerated well

## 2022-02-06 NOTE — PROGRESS NOTES
[facility]    Medicine Progress Note - Hospitalist Service, GOLD TEAM 16    Date of Admission:  2/3/2022    Assessment & Plan          Shannan Riojas is a 72 year old female admitted on 2/3/2022. She has history hypertension , essential tremor, hypothyroidism .      Osteoarthritis  -Status post  Right knee replacement, per orthopedic surgery    - had  right foot drop postop, now moving foot much better, able to dorsiflex  And plantar flex        Hypertension   - continue  metoprolol  And  Amlodipine. held her lisinopril and continue to hold till she sees her PMD and has repeat BMP .     Elevated CO2   -no history MELIA, she will need out patient  Seep study. She was  on room air in AM 8/4  and was doing well however when she was  dosing off and oxygen saturation  went down and got O2 back on and CO2 was up, ABG does show  Mild respiratory  Acidosis. She does not like  BIPAP and did not tolerate it,  CXR nothing acute , avoid/ minimize  narcotics    Her ABG 2/4 pH 7.29 CO2  52  Bicarb 25, VBG 2/5 was   pH 7.29 with venous pCO2 56, her ABG 2/5 now normal. . No evidence of lactic acidosis ( 0.6) .   over night oxymetry , was ok, she had a very brief desat once to under 90 but remained over 90% off O2 through the night so no need for homem O2 at this point, she needs out patient  Sleep study, I did discussed with  Her   -discussed aggressive IS use     CKD  - creatinine 1.5 --- 1.6  this AM, was  1.38 1/31 , hold lisinopril, avoid NSAIDS,  Gave fluid bolus 2/5 andnow creatinine 1.32   - -follow up  With PMD, discussed with  Patient          hypothyroidism  -continue medications     T2DM  -diet controlled , accu-checks insulin sliding scale for now  blood sugar 120-180       Depression  -continue ,medications            Diet: Advance Diet as Tolerated: Regular Diet Adult  Discharge Instruction - Regular Diet Adult    DVT Prophylaxis: per orthopedic surgery    Rockwell Catheter:  "Not present  Central Lines: None  Cardiac Monitoring: None  Code Status: Full Code      Disposition Plan  ok to discharge  Today from my stand               Stephanie Bazan MD  Hospitalist Service, GOLD TEAM 16  [facility]  Securely message with the Vocera Web Console (learn more here)  Text page via [facility] Paging/Directory   Please see signed in provider for up to date coverage information      Clinically Significant Risk Factors Present on Admission                 # Obesity: Estimated body mass index is 38.45 kg/m  as calculated from the following:    Height as of this encounter: 1.651 m (5' 5\").    Weight as of this encounter: 104.8 kg (231 lb 0.7 oz).      ______________________________________________________________________    Interval History      Did not sleep great as had alarms go off, otherwise feeling ok , no chest pain  No shortness of breath        Data reviewed today: I reviewed all medications, new labs and imaging results over the last 24 hours. .    Physical Exam   Vital Signs: Temp: 98.3  F (36.8  C) Temp src: Oral BP: (!) 155/70 Pulse: 83   Resp: 15 SpO2: 92 % O2 Device: None (Room air) Oxygen Delivery: 1.5 LPM  Weight: 231 lbs .67 oz     General appearence: awake alert   no apparent distress    HEENT: EOMI, PEARLA, sclera nonicteric, moist,mucus membranes,   NECK : supple  RESPIRATORY: lungs clear to auscultation bilateral,  no wheezing or crackles   CARDIOVASCULAR:S1 S2 regular rate and rhythm, no rubs gallops or murmurs appreciated  GASTROINTESTINAL:soft, non-distended , non-tender , + bowel sounds, no masses felt   SKIN: warm and dry, no mottling noted   NEUROLOGIC; awake alert and oriented, no focal deficits found  EXTREMITIES: no clubbing, cyanosis or edema , moves all extremity, good pedal pulses   MUSCULOSKELETAL: status post  Right knee surgery    Data   Recent Labs   Lab 02/06/22  0809 02/06/22  0736 02/06/22  0237 02/05/22  0806 " 02/05/22  0553 02/04/22  0827 02/04/22  0606   WBC  --  11.1*  --   --  11.0  --   --    HGB  --  10.2*  --   --  10.5*  --  11.3*   MCV  --  92  --   --  95  --   --    PLT  --  239  --   --  234  --   --    NA  --  139  --   --  139  --  136   POTASSIUM  --  4.2  --   --  4.0  --  4.8   CHLORIDE  --  108  --   --  108  --  107   CO2  --  23  --   --  24  --  25   BUN  --  32*  --   --  37*  --  35*   CR  --  1.32*  --   --  1.60*  --  1.57*   ANIONGAP  --  8  --   --  7  --  4   ELIU  --  8.7  --   --  8.9  --  8.7   * 139* 102*   < > 120*   < > 149*    < > = values in this interval not displayed.     No results found for this or any previous visit (from the past 24 hour(s)).

## 2022-02-06 NOTE — PROGRESS NOTES
"  Orthopaedic Surgery Daily Progress Note     Subjective: Shannan Riojas is a 72 year old female POD # 3 s/p right TKA. She had issues with pain control over the last couple days. She currently rates her pain at 2/10 and is controlled with PO. She notes that she had a reaction to gabapentin where she had an episode of hallucination that was unwitnessed by staff. She denies any currently hallucinations and is oriented x3. Tolerating an oral diet. No fever, chills. No chest pain or shortness of breath. No abdominal pain, nausea, vomiting. No diarrhea or constipation. No urinary difficulties.     Physical Exam   /53 (BP Location: Left arm)   Pulse 63   Temp 98  F (36.7  C) (Oral)   Resp 20   Ht 1.651 m (5' 5\")   Wt 104.8 kg (231 lb 0.7 oz)   SpO2 96%   BMI 38.45 kg/m      Intake/Output Summary (Last 24 hours) at 2/4/2022 0539  Last data filed at 2/3/2022 1510  Gross per 24 hour   Intake 850 ml   Output 300 ml   Net 550 ml     General: Alert, well-appearing in no acute distress.  Respiratory: Non-labored breathing.   Cardiovascular: Extremities warm and well perfused  Extremities: moving all four extremities.   RLE:  -Sens: SILT dp/sp/s/s/t  -Motor: 5/5 EHL/FHL/TA/GSc  -Vasc: 2+ pulses, wwp, brisk cap refill    Dressing CDI    Skin: As noted above. No rashes or lesions appreciated.    Labs    Complete Blood Count   Recent Labs   Lab 02/05/22  0553 02/04/22  0606   WBC 11.0  --    HGB 10.5* 11.3*     --      Basic Metabolic Panel  Recent Labs   Lab 02/06/22  0237 02/05/22  2153 02/05/22  1353 02/05/22  0806 02/05/22  0553 02/04/22  0827 02/04/22  0606 02/03/22  0934 02/03/22  0644   NA  --   --   --   --  139  --  136  --   --    POTASSIUM  --   --   --   --  4.0  --  4.8  --  4.8   CHLORIDE  --   --   --   --  108  --  107  --   --    CO2  --   --   --   --  24  --  25  --   --    BUN  --   --   --   --  37*  --  35*  --   --    CR  --   --   --   --  1.60*  --  1.57*  --   --    * 215* 126* " 131* 120*   < > 149*   < >  --     < > = values in this interval not displayed.     Coagulation Profile  No lab results found in last 7 days.    Assessment/Plan:  Assessment and Plan:  Shannan Riojas is a 72 year old female s/p right TKA on 2/3 with Dr. Avelar     Plan to discharge this afternoon.     Orthopedic Surgery Primary  Activity: Up with assist until independent. Knee ROM as tolerated.   Weight bearing status: WBAT.  Pain management: Transition from IV to PO as tolerated.    Antibiotics: Ancef x 24 hours  Diet: Begin with clear fluids and progress diet as tolerated.   DVT prophylaxis:  mg  Imaging: completed   Labs: Monitor Hgb   Bracing/Splinting: None.   Dressings: Keep clean, dry and intact   Elevation: Elevate RLE on pillows to keep above the level of the heart as much as possible - no pillows under knee.   Physical Therapy/Occupational Therapy: Eval and treat.  Consults: IM, SW.  Follow-up: Clinic 2 weeks  Disposition: Plan to discharge today.    John Robbins MD   Orthopaedic Surgery Resident, PGY-1  [phone]      Discussed with Dr. Avelar

## 2022-02-09 NOTE — PROGRESS NOTES
[facility]      OUTPATIENT PHYSICAL THERAPY EVALUATION  PLAN OF TREATMENT FOR OUTPATIENT REHABILITATION  (COMPLETE FOR INITIAL CLAIMS ONLY)  Patient's Last Name, First Name, M.I.  YOB: 1950  Shannan Riojas                        Provider's Name  [facility] Medical Record No.  [id number]                               Onset Date:  02/03/22   Start of Care Date:         Type:     _X_PT   ___OT   ___SLP Medical Diagnosis:                           PT Diagnosis:  difficulty ambulating   Visits from SOC:  1   _________________________________________________________________________________  Plan of Treatment/Functional Goals    Planned Interventions: balance training,bed mobility training,gait training,cryotherapy,home exercise program,patient/family education,neuromuscular re-education,ROM (range of motion),stair training,strengthening,stretching,transfer training,progressive activity/exercise,home program guidelines     Goals: See Physical Therapy Goals on Care Plan in [employer] electronic health record.    Therapy Frequency: Daily  Predicted Duration of Therapy Intervention: 2 days  _________________________________________________________________________________    I CERTIFY THE NEED FOR THESE SERVICES FURNISHED UNDER        THIS PLAN OF TREATMENT AND WHILE UNDER MY CARE     (Physician co-signature of this document indicates review and certification of the therapy plan).                 ,      Referring Physician: Ernesto Avelar MD            Initial Assessment        See Physical Therapy evaluation dated   in Epic electronic health record.

## 2022-02-13 ENCOUNTER — HEALTH MAINTENANCE LETTER (OUTPATIENT)
Age: 72
End: 2022-02-13

## 2022-02-16 ENCOUNTER — OFFICE VISIT (OUTPATIENT)
Dept: ORTHOPEDICS | Facility: CLINIC | Age: 72
End: 2022-02-16
Payer: COMMERCIAL

## 2022-02-16 DIAGNOSIS — Z96.651 S/P TOTAL KNEE REPLACEMENT, RIGHT: ICD-10-CM

## 2022-02-16 DIAGNOSIS — M17.11 PRIMARY OSTEOARTHRITIS OF RIGHT KNEE: Primary | ICD-10-CM

## 2022-02-16 PROCEDURE — 99024 POSTOP FOLLOW-UP VISIT: CPT | Performed by: STUDENT IN AN ORGANIZED HEALTH CARE EDUCATION/TRAINING PROGRAM

## 2022-02-16 RX ORDER — OXYCODONE HYDROCHLORIDE 5 MG/1
5 TABLET ORAL EVERY 6 HOURS PRN
Qty: 20 TABLET | Refills: 0 | Status: SHIPPED | OUTPATIENT
Start: 2022-02-16

## 2022-02-16 NOTE — LETTER
2/16/2022         RE: Shannan Riojas  [address]  [place] [address]        Dear Colleague,    Thank you for referring your patient, Shannan Riojas, to the [facility] [place]. Please see a copy of my visit note below.        [facility]  Orthopaedic Surgery Consultation by Ernesto Avelar M.D.    Shannan Riojas MRN# [id number]   Age: 71 year old YOB: 1950     Requesting physician: No ref. provider found  No primary care provider on file.     Background history:  DX:  1. Stage 3 chronic kidney disease (H)  2. Essential hypertension  3. Diabetes mellitus, type II (H)  4. Hypercholesterolemia  5. Obesity    TREATMENTS:  1. 2/3/2022, right total knee arthroplasty,            History of Present Illness:   71-year-old female presenting with chronic right knee pain due to underlying end-stage osteoarthritic changes most notably in the medial compartment.  Significantly reducing her quality of life and insufficiently responding to nonoperative treatment measures initiated. Patient underwent right total knee arthroplasty on 2/3/2022.    Today patient is approximately 2 weeks after the above-mentioned procedure. She states she is doing well. Her preoperative pain is gone. Her postoperative pain is well controlled on a regimen of Tylenol and occasional oxycodone. She has not started working with a physical therapist yet. The incision is healing well. No signs of infection. No fevers or chills. Patient is taking her DVT prophylaxis.    Social:   Occupation: Retired  Living situation: Lives with 2 dogs, has daughter close by  Hobbies / Sports: Sewing, reading, beading    Smoking: No  Alcohol: No  Illicit drug use: No         Physical Exam:     EXAMINATION pertinent findings:   PSYCH: Pleasant, healthy-appearing, alert, oriented x3, cooperative. Normal mood and affect.  VITAL SIGNS: There were no vitals taken for this visit.  Reviewed nursing intake  notes.   There is no height or weight on file to calculate BMI.  RESP: non labored breathing   ABD: benign, soft, non-tender, no acute peritoneal findings  SKIN: grossly normal   LYMPHATIC: grossly normal, no adenopathy, +1 extremity edema  NEURO: grossly normal , no motor deficits  VASCULAR: satisfactory perfusion of all extremities   MUSCULOSKELETAL:   Alignment: Neutral alignment of right lower extremity.    R knee: Incision is clean, dry and intact. ROM 95-0-0  .   Straight leg raise +. No redness, warmth or skin changes present. Postoperative effusion is present. Ligamentously stable in both ML and AP direction.  Normal PF tracking without crepitus. No signs of DVT.    Right LE:   Thigh and leg compartments soft and compressible   +Quad/TA/GSC/FHL/EHL   SILT DP/SP/Deepa/Saph/Tib nerve distributions   Palpable dorsalis pedis pulse              Data:   All laboratory data reviewed  All imaging studies reviewed by me personally.    XR knee right 2/3/2022:  My interpretation: Status post placement of right total knee arthroplasty.  Adequate sizing, orientation and fixation of components.  No signs of immediate postoperative complications.             Assessment and Plan:   Assessment:  71-year-old female presenting with chronic right knee pain due to underlying end-stage osteoarthritic changes most notably in the medial compartment.  Significantly reducing her quality of life and insufficiently responding to nonoperative treatment measures initiated. Patient underwent right total knee arthroplasty on 2/3/2022. Recovering appropriately.    Plan:  I extensively discussed my findings with the patient.  We discussed the intraoperative findings and today's findings.  Patient will continue to work with physical therapy on range of motion, strengthening and gait training.  Patient will continue his DVT prophylaxis until 4 weeks postoperatively.  Suture tails were removed. Patient was advised to wear thigh-high compression  stockings if the edema becomes bothersome. All questions were answered.  Patient understands and agrees to the treatment plan as set forth.  We will follow-up with patient at the 6-week postoperative date with renewed radiographic imaging studies.    Ernesto Avelar MD, PhD     Adult Reconstruction  [facility] Department of Orthopaedic Surgery  Pager [phone]

## 2022-03-04 DIAGNOSIS — M17.11 PRIMARY OSTEOARTHRITIS OF RIGHT KNEE: Primary | ICD-10-CM

## 2022-03-04 NOTE — PROGRESS NOTES
[facility]  Orthopaedic Surgery Consultation by Ernesto Avelar M.D.    Shannan Riojas MRN# [id number]   Age: 71 year old YOB: 1950     Requesting physician: No ref. provider found  No primary care provider on file.     Background history:  DX:  1. Stage 3 chronic kidney disease (H)  2. Essential hypertension  3. Diabetes mellitus, type II (H)  4. Hypercholesterolemia  5. Obesity    TREATMENTS:  1. 2/3/2022, right total knee arthroplasty,            History of Present Illness:   71-year-old female presenting with chronic right knee pain due to underlying end-stage osteoarthritic changes most notably in the medial compartment.  Significantly reducing her quality of life and insufficiently responding to nonoperative treatment measures initiated. Patient underwent right total knee arthroplasty on 2/3/2022.    Today patient is approximately 6 weeks after the above-mentioned procedure. She states she is doing well. Her preoperative pain is gone.  She no longer takes pain medication.  She is working with a physical therapist.  The incision is healing well. No signs of infection. No fevers or chills. Patient has completed her DVT prophylaxis.    Social:   Occupation: Retired  Living situation: Lives with 2 dogs, has daughter close by  Hobbies / Sports: Sewing, reading, beading    Smoking: No  Alcohol: No  Illicit drug use: No         Physical Exam:     EXAMINATION pertinent findings:   PSYCH: Pleasant, healthy-appearing, alert, oriented x3, cooperative. Normal mood and affect.  VITAL SIGNS: There were no vitals taken for this visit.  Reviewed nursing intake notes.   There is no height or weight on file to calculate BMI.  RESP: non labored breathing   ABD: benign, soft, non-tender, no acute peritoneal findings  SKIN: grossly normal   LYMPHATIC: grossly normal, no adenopathy, +1 extremity edema  NEURO: grossly normal , no motor deficits  VASCULAR: satisfactory perfusion of all  extremities   MUSCULOSKELETAL:   Alignment: Neutral alignment of right lower extremity.    R knee: Incision is clean, dry and intact. -0-0  .   Straight leg raise +. No redness, warmth or skin changes present. Postoperative effusion is decreased. Ligamentously stable in both ML and AP direction.  Normal PF tracking without crepitus. No signs of DVT.    Right LE:   Thigh and leg compartments soft and compressible   +Quad/TA/GSC/FHL/EHL   SILT DP/SP/Deepa/Saph/Tib nerve distributions   Palpable dorsalis pedis pulse              Data:   All laboratory data reviewed  All imaging studies reviewed by me personally.    XR knee right 3/16/2022:  My interpretation: Status post placement of right total knee arthroplasty.  Adequate sizing, orientation and fixation of components.  No signs of immediate postoperative complications.             Assessment and Plan:   Assessment:  71-year-old female presenting with chronic right knee pain due to underlying end-stage osteoarthritic changes most notably in the medial compartment.  Significantly reducing her quality of life and insufficiently responding to nonoperative treatment measures initiated. Patient underwent right total knee arthroplasty on 2/3/2022. Recovering appropriately.    Plan:  I extensively discussed my findings with the patient.  We discussed the intraoperative findings and today's findings.  Patient will continue to work with physical therapy on range of motion, strengthening and gait training.   All questions were answered.  Patient understands and agrees to the treatment plan as set forth.  We will follow-up with patient at the 1 year postoperative date with renewed radiographic imaging studies.    Ernesto Avelar MD, PhD     Adult Reconstruction  [facility] Department of Orthopaedic Surgery  Pager [phone]

## 2022-03-13 ASSESSMENT — KOOS JR
TWISING OR PIVOTING ON KNEE: MODERATE
HOW SEVERE IS YOUR KNEE STIFFNESS AFTER FIRST WAKING IN MORNING: MODERATE
RISING FROM SITTING: MILD
GOING UP OR DOWN STAIRS: MODERATE
KOOS JR SCORING: 68.28

## 2022-03-16 ENCOUNTER — ANCILLARY PROCEDURE (OUTPATIENT)
Dept: GENERAL RADIOLOGY | Facility: CLINIC | Age: 72
End: 2022-03-16
Attending: STUDENT IN AN ORGANIZED HEALTH CARE EDUCATION/TRAINING PROGRAM
Payer: COMMERCIAL

## 2022-03-16 ENCOUNTER — OFFICE VISIT (OUTPATIENT)
Dept: ORTHOPEDICS | Facility: CLINIC | Age: 72
End: 2022-03-16
Payer: COMMERCIAL

## 2022-03-16 DIAGNOSIS — Z96.651 S/P TOTAL KNEE REPLACEMENT, RIGHT: Primary | ICD-10-CM

## 2022-03-16 DIAGNOSIS — M17.11 PRIMARY OSTEOARTHRITIS OF RIGHT KNEE: ICD-10-CM

## 2022-03-16 PROCEDURE — 77073 BONE LENGTH STUDIES: CPT | Performed by: STUDENT IN AN ORGANIZED HEALTH CARE EDUCATION/TRAINING PROGRAM

## 2022-03-16 PROCEDURE — 99024 POSTOP FOLLOW-UP VISIT: CPT | Performed by: STUDENT IN AN ORGANIZED HEALTH CARE EDUCATION/TRAINING PROGRAM

## 2022-03-16 PROCEDURE — 73560 X-RAY EXAM OF KNEE 1 OR 2: CPT | Mod: XU | Performed by: RADIOLOGY

## 2022-03-16 NOTE — LETTER
3/16/2022         RE: Shannan Riojas  [address]  [place] [address]        Dear Colleague,    Thank you for referring your patient, Shannan Riojas, to the [facility] [place]. Please see a copy of my visit note below.        [facility]  Orthopaedic Surgery Consultation by Ernesto Avelar M.D.    Shannan Riojas MRN# [id number]   Age: 71 year old YOB: 1950     Requesting physician: No ref. provider found  No primary care provider on file.     Background history:  DX:  1. Stage 3 chronic kidney disease (H)  2. Essential hypertension  3. Diabetes mellitus, type II (H)  4. Hypercholesterolemia  5. Obesity    TREATMENTS:  1. 2/3/2022, right total knee arthroplasty,            History of Present Illness:   71-year-old female presenting with chronic right knee pain due to underlying end-stage osteoarthritic changes most notably in the medial compartment.  Significantly reducing her quality of life and insufficiently responding to nonoperative treatment measures initiated. Patient underwent right total knee arthroplasty on 2/3/2022.    Today patient is approximately 6 weeks after the above-mentioned procedure. She states she is doing well. Her preoperative pain is gone.  She no longer takes pain medication.  She is working with a physical therapist.  The incision is healing well. No signs of infection. No fevers or chills. Patient has completed her DVT prophylaxis.    Social:   Occupation: Retired  Living situation: Lives with 2 dogs, has daughter close by  Hobbies / Sports: Sewing, reading, beading    Smoking: No  Alcohol: No  Illicit drug use: No         Physical Exam:     EXAMINATION pertinent findings:   PSYCH: Pleasant, healthy-appearing, alert, oriented x3, cooperative. Normal mood and affect.  VITAL SIGNS: There were no vitals taken for this visit.  Reviewed nursing intake notes.   There is no height or weight on file to calculate  BMI.  RESP: non labored breathing   ABD: benign, soft, non-tender, no acute peritoneal findings  SKIN: grossly normal   LYMPHATIC: grossly normal, no adenopathy, +1 extremity edema  NEURO: grossly normal , no motor deficits  VASCULAR: satisfactory perfusion of all extremities   MUSCULOSKELETAL:   Alignment: Neutral alignment of right lower extremity.    R knee: Incision is clean, dry and intact. -0-0  .   Straight leg raise +. No redness, warmth or skin changes present. Postoperative effusion is decreased. Ligamentously stable in both ML and AP direction.  Normal PF tracking without crepitus. No signs of DVT.    Right LE:   Thigh and leg compartments soft and compressible   +Quad/TA/GSC/FHL/EHL   SILT DP/SP/Deepa/Saph/Tib nerve distributions   Palpable dorsalis pedis pulse              Data:   All laboratory data reviewed  All imaging studies reviewed by me personally.    XR knee right 3/16/2022:  My interpretation: Status post placement of right total knee arthroplasty.  Adequate sizing, orientation and fixation of components.  No signs of immediate postoperative complications.             Assessment and Plan:   Assessment:  71-year-old female presenting with chronic right knee pain due to underlying end-stage osteoarthritic changes most notably in the medial compartment.  Significantly reducing her quality of life and insufficiently responding to nonoperative treatment measures initiated. Patient underwent right total knee arthroplasty on 2/3/2022. Recovering appropriately.    Plan:  I extensively discussed my findings with the patient.  We discussed the intraoperative findings and today's findings.  Patient will continue to work with physical therapy on range of motion, strengthening and gait training.   All questions were answered.  Patient understands and agrees to the treatment plan as set forth.  We will follow-up with patient at the 1 year postoperative date with renewed radiographic imaging  studies.    Ernesto Avelar MD, PhD     Adult Reconstruction  [facility] Department of Orthopaedic Surgery  Pager [phone]

## 2022-05-04 ENCOUNTER — TELEPHONE (OUTPATIENT)
Dept: ORTHOPEDICS | Facility: CLINIC | Age: 72
End: 2022-05-04
Payer: COMMERCIAL

## 2022-05-04 DIAGNOSIS — Z96.651 S/P TOTAL KNEE REPLACEMENT, RIGHT: Primary | ICD-10-CM

## 2022-05-04 RX ORDER — AMOXICILLIN 500 MG/1
TABLET, FILM COATED ORAL
Qty: 4 TABLET | Refills: 3 | Status: SHIPPED | OUTPATIENT
Start: 2022-05-04

## 2022-05-04 NOTE — TELEPHONE ENCOUNTER
Spoke with patient.  She had her knee replaced in February 2022.  She is losing a bottom tooth and needs emergent dental care today.  Verified she has no known allergy to PCN or Amoxicillin.  Rx sent to pharmacy per standing order. Mehnaz Orosco RN

## 2022-05-04 NOTE — TELEPHONE ENCOUNTER
Returned call to Shannan and left voicemail encouraging to her to call back.  Informed her that since she had a total knee arthroplasty in February she needs to take antibiotics before any dental work.  She was encouraged to call back with any further questions.    Mehdi Thomas, MONI on 5/4/2022 at 10:11 AM

## 2022-05-04 NOTE — TELEPHONE ENCOUNTER
[facility] Call Center    Phone Message    May a detailed message be left on voicemail: yes     Reason for Call Patient called back . She need Antibiotics refill for Dentist appt Today. Appt at 2:30 Today. She don't think Pharmacy will have it in time . She stated will take After . Please call    Action Taken: Message routed to:  [facility] sylvia    Travel Screening: Not Applicable

## 2022-05-04 NOTE — TELEPHONE ENCOUNTER
[facility] Call [facility]    Phone Message    May a detailed message be left on voicemail: yes           Reason for Call: Other: Pt needs dental work done and is getting conflicting info on antibiotics to take or not and she has an emergency dentist appt today at 230 if someone can call her before that      Action Taken: Other: ortho    Travel Screening: Not Applicable

## 2022-06-05 ENCOUNTER — HEALTH MAINTENANCE LETTER (OUTPATIENT)
Age: 72
End: 2022-06-05

## 2022-10-15 ENCOUNTER — HEALTH MAINTENANCE LETTER (OUTPATIENT)
Age: 72
End: 2022-10-15

## 2022-12-01 DIAGNOSIS — Z96.651 S/P TOTAL KNEE REPLACEMENT, RIGHT: Primary | ICD-10-CM

## 2022-12-07 ENCOUNTER — TELEPHONE (OUTPATIENT)
Dept: ORTHOPEDICS | Facility: CLINIC | Age: 72
End: 2022-12-07

## 2022-12-07 NOTE — TELEPHONE ENCOUNTER
[facility] Call Center    Phone Message    May a detailed message be left on voicemail: yes     Reason for Call: Other: Patient had to reschedule appt with Dr. Avelar for January, but current order for imaging expires on 12/31/22.    Patient is requesting new order to accommodate Max appt.     Action Taken: Message routed to:  [facility] ortho    Travel Screening: Not Applicable

## 2022-12-07 NOTE — TELEPHONE ENCOUNTER
Voicemail left for patient letting her know that her imaging will be rescheduled when the team preps that days clinic. No need to do anything at this time.  Bess Dahl ATC

## 2023-03-26 ENCOUNTER — HEALTH MAINTENANCE LETTER (OUTPATIENT)
Age: 73
End: 2023-03-26

## 2023-04-07 ASSESSMENT — KOOS JR
STRAIGHTENING KNEE FULLY: MILD
TWISING OR PIVOTING ON KNEE: MILD
HOW SEVERE IS YOUR KNEE STIFFNESS AFTER FIRST WAKING IN MORNING: MILD
KOOS JR SCORING: 63.78
RISING FROM SITTING: MILD
BENDING TO THE FLOOR TO PICK UP OBJECT: MILD
STANDING UPRIGHT: MODERATE
GOING UP OR DOWN STAIRS: MODERATE

## 2023-04-13 NOTE — PROGRESS NOTES
[facility]  Orthopaedic Surgery Consultation by Ernesto Avelar M.D.    Shannan Riojas MRN# [id number]   Age: 71 year old YOB: 1950     Requesting physician: No ref. provider found  No primary care provider on file.     Background history:  DX:  1. Stage 3 chronic kidney disease (H)  2. Essential hypertension  3. Diabetes mellitus, type II (H)  4. Hypercholesterolemia  5. Obesity    TREATMENTS:  1. 2/3/2022, right total knee arthroplasty,            History of Present Illness:     73-year-old female presenting with chronic right knee pain due to underlying end-stage osteoarthritic changes most notably in the medial compartment.  Significantly reducing her quality of life and insufficiently responding to nonoperative treatment measures initiated. Patient underwent right total knee arthroplasty on 2/3/2022.    Today patient presents because of chronic left knee pain.  She is very happy with the results of her right total knee arthroplasty.  She still has some numbness and tingling on the lateral side but otherwise has full function and no pain or instability.   Over the last few months the pain of the left knee has slowly progressed.  She has pain on mostly the medial side of the knee but it radiates throughout.  No clear effusions.  No mechanical symptoms.  She reports occasional giving way complaints.  She describes pain at night and especially in the morning.  She would like to discuss left total knee replacement surgery.  To mitigate the pain she is tried over-the-counter analgesics, activity modification, weight reduction and home exercise regimen.  She has not tried injections.    Social:   Occupation: Retired  Living situation: Lives with 2 dogs, has daughter close by  Hobbies / Sports: Sewing, reading, beading    Smoking: No  Alcohol: No  Illicit drug use: No         Physical Exam:     EXAMINATION pertinent findings:   PSYCH: Pleasant, healthy-appearing, alert, oriented  x3, cooperative. Normal mood and affect.  VITAL SIGNS: There were no vitals taken for this visit.  Reviewed nursing intake notes.   There is no height or weight on file to calculate BMI.  RESP: non labored breathing   ABD: benign, soft, non-tender, no acute peritoneal findings  SKIN: grossly normal   LYMPHATIC: grossly normal, no adenopathy, +1 extremity edema  NEURO: grossly normal , no motor deficits  VASCULAR: satisfactory perfusion of all extremities   MUSCULOSKELETAL:   Alignment: Neutral alignment of right lower extremity.    R knee: Incision is well-healed. -0-0  .   Straight leg raise +. No redness, warmth or skin changes present. Postoperative effusion is decreased. Ligamentously stable in both ML and AP direction.  Normal PF tracking without crepitus. No signs of DVT.    L knee: -0-0  .  Full extension and deep flexion are recognizably painful in the medial side of the knee.  Straight leg raise +. No redness, warmth or skin changes present. Effusion Minimal. Ligamentously stable in both ML and AP direction.  Varus deformity is difficult to correct.  Normal PF tracking with some crepitus.  Rabot is sensitive. Meniscal provocation tests are sensitive.  There is recognizable tenderness to palpation over the tibial joint line.     Bilateral LE:   Thigh and leg compartments soft and compressible   +Quad/TA/GSC/FHL/EHL   SILT DP/SP/Deepa/Saph/Tib nerve distributions   Palpable dorsalis pedis pulse              Data:   All laboratory data reviewed  All imaging studies reviewed by me personally.    XR knee bilateral 3/16/2022 and left lateral x-ray 4/14/2023:  My interpretation: Status post placement of right total knee arthroplasty.  Adequate sizing, orientation and fixation of components.  No signs of immediate postoperative complications.  End-stage osteoarthritic changes of left knee most notably in medial compartment with complete obliteration of joint space, presence of marginal osteophytes,  sclerosis and subchondral cysts.           Assessment and Plan:   Assessment:  73-year-old female status post right total knee arthroplasty on 2/3/2022 now presenting with chronic left knee pain due to end-stage osteoarthritic changes most notably medial compartment.  Insufficiently responding to nonsurgical treatment measures initiated yet.    Plan:  I had a long discussion with the patient regarding etiology and ongoing management options.  Reviewed surgical and nonsurgical treatments.  The non-surgical options include activity modification, pain medication, PT, bracing and injection therapy.  Given patient's radiographic findings I do not believe injection therapy will provide her long-term relief.  Her varus deformity is not correctable and thus  bracing is unlikely to be successful.  She has had a good success of right total knee replacement in the past.  As for surgery we discussed the option of total knee replacement surgery. We reviewed total knee replacement in detail including the procedure, the implants, the recovery process, and long-term outcomes.  We reviewed that the risks of the surgery include but are not limited to infection, wound problems, stiffness, persistent pain, swelling, clicking, loosening, revision surgery.  We also reviewed less common risks such as neurovascular injury fracture, and other implant-related issues.  We reviewed other medical complications such as a blood clot.  We discussed that the vast majority of cases have a highly successful outcome.  However there is a small subset of patients that do experience complications or problems following the knee replacement and these problems can be very debilitating and painful and sometimes do not improve.   Based on a discussion of the risks and benefits, we believe that the benefits far outweigh the risks at this point and the patient would like to proceed with left total knee replacement surgery.  We will work on scheduling  surgery at a time that works well for patient in the next few months.  Patient will contact us if there are any questions or concerns leading up to surgery. Before surgery the patient will attend a joint replacement class and will be seen by the PCP.     More information on joint replacements can be found on : [email or web address]      Ernesto Avelar MD, PhD     Adult Reconstruction  [facility] Department of Orthopaedic Surgery  Pager [phone]

## 2023-04-14 ENCOUNTER — OFFICE VISIT (OUTPATIENT)
Dept: ORTHOPEDICS | Facility: CLINIC | Age: 73
End: 2023-04-14
Payer: COMMERCIAL

## 2023-04-14 ENCOUNTER — ANCILLARY PROCEDURE (OUTPATIENT)
Dept: GENERAL RADIOLOGY | Facility: CLINIC | Age: 73
End: 2023-04-14
Attending: STUDENT IN AN ORGANIZED HEALTH CARE EDUCATION/TRAINING PROGRAM
Payer: COMMERCIAL

## 2023-04-14 VITALS — BODY MASS INDEX: 38.44 KG/M2 | SYSTOLIC BLOOD PRESSURE: 142 MMHG | DIASTOLIC BLOOD PRESSURE: 80 MMHG | WEIGHT: 231 LBS

## 2023-04-14 DIAGNOSIS — M25.562 LEFT KNEE PAIN: ICD-10-CM

## 2023-04-14 DIAGNOSIS — M17.12 OSTEOARTHROSIS, LOCALIZED, PRIMARY, KNEE, LEFT: Primary | ICD-10-CM

## 2023-04-14 DIAGNOSIS — E66.01 MORBID OBESITY (H): ICD-10-CM

## 2023-04-14 PROCEDURE — 99214 OFFICE O/P EST MOD 30 MIN: CPT | Performed by: STUDENT IN AN ORGANIZED HEALTH CARE EDUCATION/TRAINING PROGRAM

## 2023-04-14 PROCEDURE — 73560 X-RAY EXAM OF KNEE 1 OR 2: CPT | Mod: TC | Performed by: RADIOLOGY

## 2023-04-14 RX ORDER — TRANEXAMIC ACID 650 MG/1
1950 TABLET ORAL ONCE
Status: CANCELLED | OUTPATIENT
Start: 2023-04-14 | End: 2023-04-14

## 2023-04-14 NOTE — PATIENT INSTRUCTIONS
1. Left knee pain          Schedule left total knee arthroplasty surgery  Surgery Scheduler will contact you to assist with scheduling surgery.   You can contact her directly at [phone].   Prior to your scheduled surgery we advise scheduling with your dentist to obtain clearance for surgery, and to complete any recommended dental work prior.     Pre-operative Physical needed within 30 days of scheduled proceedure  Physical Therapy will be scheduled to start post op day 2-3.    For mor information regarding total joint surgery please visit our website:   [email or web address]    FORMS:   If you are needing any forms completed relating to your upcoming procedure, please send them to our office with a completed Release of Information.   Forms will be completed AFTER your procedure. A letter can be sent to your employer prior to surgery to inform them of your anticipated time off.    Please notify our staff if you would like a letter to do so.   Forms can be faxed directly to our clinic at [phone].     DO NOT BRING FORMS ON THE DATE OF SURGERY.     MEDICATION REFILL:   Please allow 3 business days for completion of medication refills for any surgery related prescription.   Medication refills submitted on Friday, may not be addressed until the following Monday.  You may request a refill via [facility], or by calling our Nurse Triage at [phone].    Call my office with any questions or concerns, [phone].

## 2023-04-14 NOTE — LETTER
4/14/2023         RE: Shannan Riojas  [address]  [facility] [address]        Dear Colleague,    Thank you for referring your patient, Shannan Riojas, to the [facility] [place]. Please see a copy of my visit note below.        [facility]  Orthopaedic Surgery Consultation by Ernesto Avelar M.D.    Shannan Riojas MRN# [id number]   Age: 71 year old YOB: 1950     Requesting physician: No ref. provider found  No primary care provider on file.     Background history:  DX:  1. Stage 3 chronic kidney disease (H)  2. Essential hypertension  3. Diabetes mellitus, type II (H)  4. Hypercholesterolemia  5. Obesity    TREATMENTS:  1. 2/3/2022, right total knee arthroplasty,            History of Present Illness:     73-year-old female presenting with chronic right knee pain due to underlying end-stage osteoarthritic changes most notably in the medial compartment.  Significantly reducing her quality of life and insufficiently responding to nonoperative treatment measures initiated. Patient underwent right total knee arthroplasty on 2/3/2022.    Today patient presents because of chronic left knee pain.  She is very happy with the results of her right total knee arthroplasty.  She still has some numbness and tingling on the lateral side but otherwise has full function and no pain or instability.   Over the last few months the pain of the left knee has slowly progressed.  She has pain on mostly the medial side of the knee but it radiates throughout.  No clear effusions.  No mechanical symptoms.  She reports occasional giving way complaints.  She describes pain at night and especially in the morning.  She would like to discuss left total knee replacement surgery.  To mitigate the pain she is tried over-the-counter analgesics, activity modification, weight reduction and home exercise regimen.  She has not tried injections.    Social:   Occupation:  Retired  Living situation: Lives with 2 dogs, has daughter close by  Hobbies / Sports: Sewing, reading, beading    Smoking: No  Alcohol: No  Illicit drug use: No         Physical Exam:     EXAMINATION pertinent findings:   PSYCH: Pleasant, healthy-appearing, alert, oriented x3, cooperative. Normal mood and affect.  VITAL SIGNS: There were no vitals taken for this visit.  Reviewed nursing intake notes.   There is no height or weight on file to calculate BMI.  RESP: non labored breathing   ABD: benign, soft, non-tender, no acute peritoneal findings  SKIN: grossly normal   LYMPHATIC: grossly normal, no adenopathy, +1 extremity edema  NEURO: grossly normal , no motor deficits  VASCULAR: satisfactory perfusion of all extremities   MUSCULOSKELETAL:   Alignment: Neutral alignment of right lower extremity.    R knee: Incision is well-healed. -0-0  .   Straight leg raise +. No redness, warmth or skin changes present. Postoperative effusion is decreased. Ligamentously stable in both ML and AP direction.  Normal PF tracking without crepitus. No signs of DVT.    L knee: -0-0  .  Full extension and deep flexion are recognizably painful in the medial side of the knee.  Straight leg raise +. No redness, warmth or skin changes present. Effusion Minimal. Ligamentously stable in both ML and AP direction.  Varus deformity is difficult to correct.  Normal PF tracking with some crepitus.  Rabot is sensitive. Meniscal provocation tests are sensitive.  There is recognizable tenderness to palpation over the tibial joint line.     Bilateral LE:   Thigh and leg compartments soft and compressible   +Quad/TA/GSC/FHL/EHL   SILT DP/SP/Deepa/Saph/Tib nerve distributions   Palpable dorsalis pedis pulse              Data:   All laboratory data reviewed  All imaging studies reviewed by me personally.    XR knee bilateral 3/16/2022 and left lateral x-ray 4/14/2023:  My interpretation: Status post placement of right total knee  arthroplasty.  Adequate sizing, orientation and fixation of components.  No signs of immediate postoperative complications.  End-stage osteoarthritic changes of left knee most notably in medial compartment with complete obliteration of joint space, presence of marginal osteophytes, sclerosis and subchondral cysts.           Assessment and Plan:   Assessment:  73-year-old female status post right total knee arthroplasty on 2/3/2022 now presenting with chronic left knee pain due to end-stage osteoarthritic changes most notably medial compartment.  Insufficiently responding to nonsurgical treatment measures initiated yet.    Plan:  I had a long discussion with the patient regarding etiology and ongoing management options.  Reviewed surgical and nonsurgical treatments.  The non-surgical options include activity modification, pain medication, PT, bracing and injection therapy.  Given patient's radiographic findings I do not believe injection therapy will provide her long-term relief.  Her varus deformity is not correctable and thus  bracing is unlikely to be successful.  She has had a good success of right total knee replacement in the past.  As for surgery we discussed the option of total knee replacement surgery. We reviewed total knee replacement in detail including the procedure, the implants, the recovery process, and long-term outcomes.  We reviewed that the risks of the surgery include but are not limited to infection, wound problems, stiffness, persistent pain, swelling, clicking, loosening, revision surgery.  We also reviewed less common risks such as neurovascular injury fracture, and other implant-related issues.  We reviewed other medical complications such as a blood clot.  We discussed that the vast majority of cases have a highly successful outcome.  However there is a small subset of patients that do experience complications or problems following the knee replacement and these problems can be very  debilitating and painful and sometimes do not improve.   Based on a discussion of the risks and benefits, we believe that the benefits far outweigh the risks at this point and the patient would like to proceed with left total knee replacement surgery.  We will work on scheduling surgery at a time that works well for patient in the next few months.  Patient will contact us if there are any questions or concerns leading up to surgery. Before surgery the patient will attend a joint replacement class and will be seen by the PCP.     More information on joint replacements can be found on : [email or web address]      Ernesto Avelar MD, PhD     Adult Reconstruction  [facility] Department of Orthopaedic Surgery  Pager [phone]      Again, thank you for allowing me to participate in the care of your patient.        Sincerely,        Ernesto Avelar MD

## 2023-04-17 ENCOUNTER — TELEPHONE (OUTPATIENT)
Dept: ORTHOPEDICS | Facility: CLINIC | Age: 73
End: 2023-04-17
Payer: COMMERCIAL

## 2023-04-17 NOTE — TELEPHONE ENCOUNTER
Phoned patient to get her scheduled for surgery with Dr. Avelar    Patient was unavailable,   Provided call back number in voicemail:   [phone].

## 2023-05-19 ENCOUNTER — TELEPHONE (OUTPATIENT)
Dept: ORTHOPEDICS | Facility: CLINIC | Age: 73
End: 2023-05-19
Payer: COMMERCIAL

## 2023-05-19 ENCOUNTER — HOSPITAL ENCOUNTER (OUTPATIENT)
Facility: CLINIC | Age: 73
End: 2023-05-19
Attending: STUDENT IN AN ORGANIZED HEALTH CARE EDUCATION/TRAINING PROGRAM | Admitting: STUDENT IN AN ORGANIZED HEALTH CARE EDUCATION/TRAINING PROGRAM
Payer: COMMERCIAL

## 2023-05-19 NOTE — TELEPHONE ENCOUNTER
Phoned the patient and talked to her daughter. Her daughter stated she'll have her mother contact us and schedule surgery with Dr. Avelar. Gave the direct line to call back. [phone].

## 2023-05-19 NOTE — TELEPHONE ENCOUNTER
Patient is scheduled for surgery with Dr. Avelar    Spoke with: patient    Date of Surgery: 09/12/23    Location: [facility]    Informed patient they will need an adult  yes    H&P: Scheduled with: PCP    Additional imaging/appointments: pop made    Surgery packet: mailed     Additional comments: N/A

## 2023-06-29 DIAGNOSIS — Z96.651 S/P TOTAL KNEE ARTHROPLASTY, RIGHT: Primary | ICD-10-CM

## 2023-06-29 RX ORDER — AMOXICILLIN 500 MG/1
CAPSULE ORAL
Qty: 4 CAPSULE | Refills: 0 | Status: SHIPPED | OUTPATIENT
Start: 2023-06-29 | End: 2023-11-14

## 2023-07-31 ENCOUNTER — TELEPHONE (OUTPATIENT)
Dept: ORTHOPEDICS | Facility: CLINIC | Age: 73
End: 2023-07-31
Payer: COMMERCIAL

## 2023-07-31 DIAGNOSIS — M17.12 OSTEOARTHRITIS OF LEFT KNEE: Primary | ICD-10-CM

## 2023-07-31 NOTE — TELEPHONE ENCOUNTER
Attempted to phone patient to review pre-op teaching.  No answer during phone call, VM left with call back instructions.    Post op PT order entered.    Bonnie Cornejo RN on 7/31/2023 at 11:21 AM

## 2023-07-31 NOTE — TELEPHONE ENCOUNTER
Unsure of what patient intends to communicate, please advise them as they need.  Jeevan Gilbert, ATC

## 2023-07-31 NOTE — TELEPHONE ENCOUNTER
[facility] Call Center    Phone Message    May a detailed message be left on voicemail: yes     Reason for Call: Other: Shannan is returning a call to Bonnie. Please call her back     Action Taken: Other: [facility]    Travel Screening: Not Applicable

## 2023-07-31 NOTE — TELEPHONE ENCOUNTER
Attempted to phone patient back regarding surgery pre-op teaching.  No answer during phone call. Writer left  with call back info.    Her phone only rings once then goes to straight to .  If she calls back, please ask if there is a better number to try.    Bonnie Cornejo RN on 7/31/2023 at 12:18 PM

## 2023-08-26 ENCOUNTER — HEALTH MAINTENANCE LETTER (OUTPATIENT)
Age: 73
End: 2023-08-26

## 2023-11-04 ENCOUNTER — HEALTH MAINTENANCE LETTER (OUTPATIENT)
Age: 73
End: 2023-11-04

## 2023-11-14 ENCOUNTER — MYC REFILL (OUTPATIENT)
Dept: ORTHOPEDICS | Facility: CLINIC | Age: 73
End: 2023-11-14
Payer: COMMERCIAL

## 2023-11-14 DIAGNOSIS — Z96.651 S/P TOTAL KNEE ARTHROPLASTY, RIGHT: ICD-10-CM

## 2023-11-15 RX ORDER — AMOXICILLIN 500 MG/1
CAPSULE ORAL
Qty: 4 CAPSULE | Refills: 0 | Status: SHIPPED | OUTPATIENT
Start: 2023-11-15 | End: 2024-03-07

## 2024-01-13 ENCOUNTER — HEALTH MAINTENANCE LETTER (OUTPATIENT)
Age: 74
End: 2024-01-13

## 2024-03-07 ENCOUNTER — MYC REFILL (OUTPATIENT)
Dept: ORTHOPEDICS | Facility: CLINIC | Age: 74
End: 2024-03-07
Payer: COMMERCIAL

## 2024-03-07 DIAGNOSIS — Z96.651 S/P TOTAL KNEE ARTHROPLASTY, RIGHT: ICD-10-CM

## 2024-03-07 RX ORDER — AMOXICILLIN 500 MG/1
CAPSULE ORAL
Qty: 4 CAPSULE | Refills: 2 | Status: SHIPPED | OUTPATIENT
Start: 2024-03-07

## 2024-05-26 ENCOUNTER — HEALTH MAINTENANCE LETTER (OUTPATIENT)
Age: 74
End: 2024-05-26

## 2024-10-13 ENCOUNTER — HEALTH MAINTENANCE LETTER (OUTPATIENT)
Age: 74
End: 2024-10-13

## 2024-12-21 ENCOUNTER — HEALTH MAINTENANCE LETTER (OUTPATIENT)
Age: 74
End: 2024-12-21

## 2025-01-25 ENCOUNTER — HEALTH MAINTENANCE LETTER (OUTPATIENT)
Age: 75
End: 2025-01-25

## 2025-05-03 ENCOUNTER — HEALTH MAINTENANCE LETTER (OUTPATIENT)
Age: 75
End: 2025-05-03

## 2025-08-16 ENCOUNTER — HEALTH MAINTENANCE LETTER (OUTPATIENT)
Age: 75
End: 2025-08-16

## (undated) DEVICE — SOL NACL 0.9% IRRIG 1000ML BOTTLE 2F7124

## (undated) DEVICE — DRSG TEGADERM 4X10" 1627

## (undated) DEVICE — DRSG STERI STRIP 1/2X4" R1547

## (undated) DEVICE — BASIN SET MAJOR

## (undated) DEVICE — SUCTION IRR SYSTEM W/O TIP INTERPULSE HANDPIECE 0210-100-000

## (undated) DEVICE — Device

## (undated) DEVICE — SU VICRYL 1 CT-1 CR 8X18" J741D

## (undated) DEVICE — SU VICRYL 2-0 CT-1 27" UND J259H

## (undated) DEVICE — SOL WATER IRRIG 1000ML BOTTLE 2F7114

## (undated) DEVICE — GLOVE PROTEXIS W/NEU-THERA 7.5  2D73TE75

## (undated) DEVICE — SUCTION MANIFOLD NEPTUNE 2 SYS 4 PORT 0702-020-000

## (undated) DEVICE — PREP CHLORAPREP 26ML TINTED HI-LITE ORANGE 930815

## (undated) DEVICE — SU VICRYL 0 CT-1 CR 8X27" UND JJ41G

## (undated) DEVICE — BLADE SAW SAGITTAL STRK 18X90X1.27MM HD SYS 6 6118-127-090

## (undated) DEVICE — DECANTER VIAL 2006S

## (undated) DEVICE — BRUSH SURGICAL SCRUB W/4% CHLORHEXIDINE GLUCONATE SOL 4458A

## (undated) DEVICE — BONE CEMENT MIXEVAC HI VAC W/CARTRIDGE 0306-563-000

## (undated) DEVICE — GLOVE PROTEXIS BLUE W/NEU-THERA 8.0  2D73EB80

## (undated) DEVICE — DRSG TEGADERM ALGINATE AG 4X5" 90303

## (undated) DEVICE — ESU GROUND PAD ADULT W/CORD E7507

## (undated) DEVICE — LINEN TOWEL PACK X5 5464

## (undated) DEVICE — LINEN BACK PACK 5440

## (undated) DEVICE — BONE CLEANING TIP INTERPULSE  0210-010-000

## (undated) DEVICE — BLADE CLIPPER SGL USE 9680

## (undated) DEVICE — EYE PREP BETADINE 5% SOLUTION 30ML 0065-0411-30

## (undated) DEVICE — SOL NACL 0.9% IRRIG 3000ML BAG 2B7477

## (undated) DEVICE — GOWN IMPERVIOUS SPECIALTY XLG/XLONG 32474

## (undated) DEVICE — STRAP KNEE/BODY 31143004

## (undated) DEVICE — DRAPE U-DRAPE 1015NSD NON-STERILE

## (undated) DEVICE — TOURNIQUET CUFF 34" REPRO BROWN 60-7070-106

## (undated) DEVICE — HOOD FLYTE W/PEELAWAY 408-800-100

## (undated) DEVICE — ESU PENCIL W/SMOKE EVAC NEPTUNE STRYKER 0703-046-000

## (undated) DEVICE — SU MONOCRYL 3-0 PS-1 27" Y936H

## (undated) RX ORDER — OXYCODONE HYDROCHLORIDE 5 MG/1
TABLET ORAL
Status: DISPENSED
Start: 2022-02-03

## (undated) RX ORDER — ONDANSETRON 2 MG/ML
INJECTION INTRAMUSCULAR; INTRAVENOUS
Status: DISPENSED
Start: 2022-02-03

## (undated) RX ORDER — HYDROMORPHONE HYDROCHLORIDE 1 MG/ML
INJECTION, SOLUTION INTRAMUSCULAR; INTRAVENOUS; SUBCUTANEOUS
Status: DISPENSED
Start: 2022-02-03

## (undated) RX ORDER — TRANEXAMIC ACID 650 MG/1
TABLET ORAL
Status: DISPENSED
Start: 2022-02-03

## (undated) RX ORDER — CEFAZOLIN SODIUM 2 G/100ML
INJECTION, SOLUTION INTRAVENOUS
Status: DISPENSED
Start: 2022-02-03

## (undated) RX ORDER — SODIUM CHLORIDE, SODIUM LACTATE, POTASSIUM CHLORIDE, CALCIUM CHLORIDE 600; 310; 30; 20 MG/100ML; MG/100ML; MG/100ML; MG/100ML
INJECTION, SOLUTION INTRAVENOUS
Status: DISPENSED
Start: 2022-02-03

## (undated) RX ORDER — PROPOFOL 10 MG/ML
INJECTION, EMULSION INTRAVENOUS
Status: DISPENSED
Start: 2022-02-03

## (undated) RX ORDER — FENTANYL CITRATE 50 UG/ML
INJECTION, SOLUTION INTRAMUSCULAR; INTRAVENOUS
Status: DISPENSED
Start: 2022-02-03

## (undated) RX ORDER — LIDOCAINE HYDROCHLORIDE 20 MG/ML
INJECTION, SOLUTION EPIDURAL; INFILTRATION; INTRACAUDAL; PERINEURAL
Status: DISPENSED
Start: 2022-02-03